# Patient Record
Sex: MALE | Race: WHITE | NOT HISPANIC OR LATINO | Employment: FULL TIME | URBAN - METROPOLITAN AREA
[De-identification: names, ages, dates, MRNs, and addresses within clinical notes are randomized per-mention and may not be internally consistent; named-entity substitution may affect disease eponyms.]

---

## 2017-05-11 ENCOUNTER — HOSPITAL ENCOUNTER (EMERGENCY)
Facility: HOSPITAL | Age: 47
Discharge: HOME | End: 2017-05-11
Attending: SURGERY
Payer: MEDICAID

## 2017-05-11 VITALS
WEIGHT: 211 LBS | SYSTOLIC BLOOD PRESSURE: 117 MMHG | HEART RATE: 69 BPM | DIASTOLIC BLOOD PRESSURE: 66 MMHG | RESPIRATION RATE: 18 BRPM

## 2017-05-11 DIAGNOSIS — F19.10 DRUG ABUSE: ICD-10-CM

## 2017-05-11 LAB
ALBUMIN SERPL BCP-MCNC: 4.3 G/DL
ALP SERPL-CCNC: 59 U/L
ALT SERPL W/O P-5'-P-CCNC: 30 U/L
AMPHET+METHAMPHET UR QL: ABNORMAL
ANION GAP SERPL CALC-SCNC: 14 MMOL/L
APAP SERPL-MCNC: <3 UG/ML
AST SERPL-CCNC: 43 U/L
BACTERIA #/AREA URNS HPF: ABNORMAL /HPF
BARBITURATES UR QL SCN>200 NG/ML: ABNORMAL
BASOPHILS # BLD AUTO: 0.04 K/UL
BASOPHILS NFR BLD: 0.7 %
BENZODIAZ UR QL SCN>200 NG/ML: ABNORMAL
BILIRUB SERPL-MCNC: 0.5 MG/DL
BILIRUB UR QL STRIP: ABNORMAL
BNP SERPL-MCNC: <10 PG/ML
BUN SERPL-MCNC: 16 MG/DL
BZE UR QL SCN: ABNORMAL
CALCIUM SERPL-MCNC: 9.4 MG/DL
CANNABINOIDS UR QL SCN: ABNORMAL
CHLORIDE SERPL-SCNC: 103 MMOL/L
CK MB SERPL-MCNC: 8.1 NG/ML
CK MB SERPL-RTO: 0.7 %
CK SERPL-CCNC: 1194 U/L
CK SERPL-CCNC: 1194 U/L
CLARITY UR: CLEAR
CO2 SERPL-SCNC: 25 MMOL/L
COLOR UR: YELLOW
CREAT SERPL-MCNC: 1.5 MG/DL
CREAT UR-MCNC: 421.6 MG/DL
DIFFERENTIAL METHOD: ABNORMAL
EOSINOPHIL # BLD AUTO: 0.1 K/UL
EOSINOPHIL NFR BLD: 2.1 %
ERYTHROCYTE [DISTWIDTH] IN BLOOD BY AUTOMATED COUNT: 12.7 %
EST. GFR  (AFRICAN AMERICAN): >60 ML/MIN/1.73 M^2
EST. GFR  (NON AFRICAN AMERICAN): 55 ML/MIN/1.73 M^2
ETHANOL SERPL-MCNC: <10 MG/DL
GLUCOSE SERPL-MCNC: 214 MG/DL
GLUCOSE UR QL STRIP: NEGATIVE
HCT VFR BLD AUTO: 39.4 %
HGB BLD-MCNC: 13.6 G/DL
HGB UR QL STRIP: NEGATIVE
HYALINE CASTS #/AREA URNS LPF: 3 /LPF
KETONES UR QL STRIP: ABNORMAL
LEUKOCYTE ESTERASE UR QL STRIP: NEGATIVE
LYMPHOCYTES # BLD AUTO: 1.8 K/UL
LYMPHOCYTES NFR BLD: 28.6 %
MCH RBC QN AUTO: 31.1 PG
MCHC RBC AUTO-ENTMCNC: 34.5 %
MCV RBC AUTO: 90 FL
METHADONE UR QL SCN>300 NG/ML: NEGATIVE
MICROSCOPIC COMMENT: ABNORMAL
MONOCYTES # BLD AUTO: 0.5 K/UL
MONOCYTES NFR BLD: 7.6 %
NEUTROPHILS # BLD AUTO: 3.8 K/UL
NEUTROPHILS NFR BLD: 61 %
NITRITE UR QL STRIP: NEGATIVE
OPIATES UR QL SCN: ABNORMAL
PCP UR QL SCN>25 NG/ML: NEGATIVE
PH UR STRIP: 6 [PH] (ref 5–8)
PLATELET # BLD AUTO: 258 K/UL
PMV BLD AUTO: 11.3 FL
POTASSIUM SERPL-SCNC: 3.7 MMOL/L
PROT SERPL-MCNC: 8 G/DL
PROT UR QL STRIP: ABNORMAL
RBC # BLD AUTO: 4.37 M/UL
RBC #/AREA URNS HPF: 3 /HPF (ref 0–4)
SALICYLATES SERPL-MCNC: <5 MG/DL
SODIUM SERPL-SCNC: 142 MMOL/L
SP GR UR STRIP: >=1.03 (ref 1–1.03)
SQUAMOUS #/AREA URNS HPF: 0 /HPF
T4 FREE SERPL-MCNC: 1.19 NG/DL
TOXICOLOGY INFORMATION: ABNORMAL
TROPONIN I SERPL DL<=0.01 NG/ML-MCNC: <0.006 NG/ML
TSH SERPL DL<=0.005 MIU/L-ACNC: 4.07 UIU/ML
URN SPEC COLLECT METH UR: ABNORMAL
UROBILINOGEN UR STRIP-ACNC: ABNORMAL EU/DL
WBC # BLD AUTO: 6.15 K/UL
WBC #/AREA URNS HPF: 30 /HPF (ref 0–5)

## 2017-05-11 PROCEDURE — 80307 DRUG TEST PRSMV CHEM ANLYZR: CPT | Mod: 59

## 2017-05-11 PROCEDURE — 82570 ASSAY OF URINE CREATININE: CPT

## 2017-05-11 PROCEDURE — 84484 ASSAY OF TROPONIN QUANT: CPT

## 2017-05-11 PROCEDURE — 25000003 PHARM REV CODE 250: Performed by: SURGERY

## 2017-05-11 PROCEDURE — 80329 ANALGESICS NON-OPIOID 1 OR 2: CPT

## 2017-05-11 PROCEDURE — 96360 HYDRATION IV INFUSION INIT: CPT

## 2017-05-11 PROCEDURE — 80320 DRUG SCREEN QUANTALCOHOLS: CPT

## 2017-05-11 PROCEDURE — 83880 ASSAY OF NATRIURETIC PEPTIDE: CPT

## 2017-05-11 PROCEDURE — 84439 ASSAY OF FREE THYROXINE: CPT

## 2017-05-11 PROCEDURE — 36415 COLL VENOUS BLD VENIPUNCTURE: CPT

## 2017-05-11 PROCEDURE — 85025 COMPLETE CBC W/AUTO DIFF WBC: CPT

## 2017-05-11 PROCEDURE — 99284 EMERGENCY DEPT VISIT MOD MDM: CPT | Mod: 25

## 2017-05-11 PROCEDURE — 81000 URINALYSIS NONAUTO W/SCOPE: CPT

## 2017-05-11 PROCEDURE — 80053 COMPREHEN METABOLIC PANEL: CPT

## 2017-05-11 PROCEDURE — 80307 DRUG TEST PRSMV CHEM ANLYZR: CPT

## 2017-05-11 PROCEDURE — 84443 ASSAY THYROID STIM HORMONE: CPT

## 2017-05-11 PROCEDURE — 93010 ELECTROCARDIOGRAM REPORT: CPT | Mod: ,,, | Performed by: INTERNAL MEDICINE

## 2017-05-11 PROCEDURE — 96361 HYDRATE IV INFUSION ADD-ON: CPT

## 2017-05-11 PROCEDURE — 93005 ELECTROCARDIOGRAM TRACING: CPT

## 2017-05-11 PROCEDURE — 82553 CREATINE MB FRACTION: CPT

## 2017-05-11 RX ORDER — SODIUM CHLORIDE 9 MG/ML
1000 INJECTION, SOLUTION INTRAVENOUS
Status: COMPLETED | OUTPATIENT
Start: 2017-05-11 | End: 2017-05-11

## 2017-05-11 RX ADMIN — SODIUM CHLORIDE 1000 ML: 0.9 INJECTION, SOLUTION INTRAVENOUS at 06:05

## 2017-05-11 RX ADMIN — SODIUM CHLORIDE 1000 ML: 0.9 INJECTION, SOLUTION INTRAVENOUS at 02:05

## 2017-05-11 NOTE — ED PROVIDER NOTES
[facility]                                        May 11, 2017                   Chief Complaint  47 y.o. male with Drug Overdose    History of Present Illness  Efren Babin presents to the emergency room with heroin overdose tonight  Patient started to use heroin after the family left the house approximately one hour ago  Patient states he does not know the medication he took but is abusing illegal drugs  Patient states that this was not a suicide attempt but rather him getting high today  The patient was found unresponsive by EMS and briefly coded prior to ER arrival  Narcan given by EMS May the patient alert, was appropriate after that medication  Patient has a long history of cocaine, methamphetamine and heroin abuse issues    The history is provided by the patient  Medical history: Drug abuse  History reviewed. No pertinent surgical history.   No Known Allergies     Review of Systems and Physical Exam     Review of Systems  -- Constitution - no fever, denies fatigue, no weakness, no chills  -- Eyes - no tearing or redness, no visual disturbance  -- Ear, Nose - no tinnitus or earache, no nasal congestion or discharge  -- Mouth,Throat - no sore throat, no toothache, normal voice, normal swallowing  -- Respiratory - denies cough and congestion, no shortness of breath, no ORTIZ  -- Cardiovascular - denies chest pain, no palpitations, denies claudication  -- Gastrointestinal - denies abdominal pain, nausea, vomiting, or diarrheacy   -- Musculoskeletal - denies back pain, negative for myalgias and arthralgias   -- Neurological - no headache, denies weakness or seizure; no LOC  -- Skin - denies pallor, rash, or changes in skin. no hives or welts noted    Vital Signs  -- His blood pressure is 117/66 and his pulse is 69. His respiration is 18.      Physical Exam  -- Nursing note and vitals reviewed  -- Head: Atraumatic. Normocephalic. No obvious abnormality  -- Eyes: Pupils are equal and  reactive to light. Normal conjunctiva and lids  -- Cardiac: Normal rate, regular rhythm and normal heart sounds  -- Pulmonary: Normal respiratory effort, breath sounds clear to auscultation  -- Abdominal: Soft, no tenderness. Normal bowel sounds. Normal liver edge  -- Musculoskeletal: Normal range of motion, no effusions. Joints stable   -- Neurological: No focal deficits. Showed good interaction with staff  -- Vascular: Posterior tibial, dorsalis pedis and radial pulses 2+ bilaterally      Emergency Room Course     Treatment and Evaluation  -- The EKG findings today were without concerning findings from baseline   -- The CBC drawn in the ER today was within normal limits   -- The troponin drawn in the ER today was within normal limits   -- Urinalysis performed during this ER visit showed no signs of infection   -- UDS was positive for polysubstance abuse  -- The tylenol level drawn today was within normal limits  -- The salicylate level drawn today was within normal limits  -- The ethanol level drawn today in the ER was zero   -- Saline lock started in the ER per protocol  -- IV Fluids given in today in the ER  -- Cardiac monitoring per protocol    Abnormal lab values  -- CPK 1194 (*)   -- CPK MB 8.1 (*)   -- Glucose 214 (*)   -- Creatinine 1.5 (*)   -- AST 43 (*)   -- eGFR 55 (*)   -- TSH 4.068 (*)     Diagnosis  -- The encounter diagnosis was Drug abuse.    Disposition and Plan  -- Disposition: home  -- Condition: stable  -- Follow-up: Patient to follow up with a MD in 1-2 days.  -- I advised the patient that we have found no life threatening condition today  -- At this time, I believe the patient is clinically stable for discharge.   -- The patient acknowledges that close follow up with a MD is required   -- Patient agrees to comply with all instruction and direction given in the ER    This note is dictated on Dragon Natural Speaking word recognition program.  There are word recognition mistakes that are  occasionally missed on review.           René Browne MD  05/11/17 1549

## 2017-05-11 NOTE — ED AVS SNAPSHOT
[facility]  [address] [facility] [address]               Efren Babin   2017 12:28 AM   ED    Description:  Male : 1970   Department:  [facility]           Your Care was Coordinated By:     Provider Role From To    René Browne MD Attending Provider 17 0022 --      Reason for Visit     Drug Overdose           Diagnoses this Visit        Comments    Drug abuse           ED Disposition     ED Disposition Condition Comment    Discharge             To Do List           Follow-up Information     Follow up with [facility]. Schedule an appointment as soon as possible for a visit in 2 days.    Contact information:    [address] [facility] [address]        [facility]     [facility] On Call Nurse Care Line -  Assistance  Unless otherwise directed by your provider, please contact [facility], our nurse care line that is available for  assistance.     Registered nurses in the [facility] provide: appointment scheduling, clinical advisement, health education, and other advisory services.  Call: [phone] (toll free)               Medications           These medications were administered today        Dose Freq    0.9%  NaCl infusion 1,000 mL ED 1 Time    Sig: Inject 1,000 mLs into the vein ED 1 Time.    Class: Normal    Route: Intravenous    0.9%  NaCl infusion 1,000 mL ED 1 Time    Sig: Inject 1,000 mLs into the vein ED 1 Time.    Class: Normal    Route: Intravenous           Verify that the below list of medications is an accurate representation of the medications you are currently taking.  If none reported, the list may be blank. If incorrect, please contact your healthcare provider. Carry this list with you in case of emergency.           Current Medications            Clinical Reference Information           Your Vitals Were     BP Pulse Resp Weight          117/66 69 18  95.7 kg (211 lb)        Allergies as of 5/11/2017     No Known Allergies      Immunizations Administered on Date of Encounter - 5/11/2017     None      ED Micro, Lab, POCT     Start Ordered       Status Ordering Provider    05/11/17 0030 05/11/17 0029  Troponin I  STAT      Final result     05/11/17 0030 05/11/17 0029  CK  STAT      Final result     05/11/17 0030 05/11/17 0029  CK-MB  STAT      Final result     05/11/17 0030 05/11/17 0029  Brain natriuretic peptide  STAT      Final result     05/11/17 0030 05/11/17 0029  Comprehensive metabolic panel  STAT      Final result     05/11/17 0030 05/11/17 0029  CBC auto differential  STAT      Final result     05/11/17 0030 05/11/17 0029  Acetaminophen level  STAT      Final result     05/11/17 0030 05/11/17 0029  Salicylate level  STAT      Final result     05/11/17 0030 05/11/17 0029  Urinalysis  STAT      In process     05/11/17 0030 05/11/17 0029  Drug screen panel, emergency  STAT      In process     05/11/17 0030 05/11/17 0029  TSH  STAT      Final result     05/11/17 0030 05/11/17 0029  Ethanol  STAT      Final result     05/11/17 0029 05/11/17 0029  T4, free  Once      Final result     05/11/17 0029 05/11/17 0029  Urinalysis Microscopic  Once      In process       ED Imaging Orders     None        Discharge Instructions         Recovering from Addiction: Continuing with Counseling  The road to recovery can be tough. But working with a counselor can help make your recovery smoother and keep you on track. A counselor can help you decide which lifestyle changes you want to make to stay sober. Also, consider talking with a counselor about other issues you may want to work on. He or she can help you find resources for anger management, problem-solving skills, or assertiveness training.    Be aware of your triggers  Triggers are things that make you want to use again. They can be people you used with or places, things, and events that make you want to use. Stress and  "feelings like loneliness, anxiety, or depression can also make you want to use again. When you know what your triggers are, you can plan ways to avoid them when possible. To find your triggers, get a piece of paper. List the people, places, events, or feelings that could make you want to use again. Keep this paper. Add to it as needed. Once you have a full list, decide how to cope with these triggers without using.  Getting help  Once you admit that you have a substance abuse problem, there are many ways to find help.  · Contact your Employee Assistance Program (EAP) or Human Resources department.  · Talk to your primary care doctor and ask for a referral to an addiction specialist for an evaluation.  · Look in the white pages of your phone book for local chapters of these groups:  ¨ Alcoholics Anonymous  ¨ Cocaine Anonymous  ¨ Marijuana Anonymous  ¨ Narcotics Anonymous  ¨ SMART Recovery  · Look in the yellow pages of your phone book under 1 of the following:  ¨ Alcoholism Information and Treatment Center  ¨ Drug Abuse and Addiction Information and Treatment Center  · Look online for treatment centers near you:  ¨ Substance Abuse and Mental Health Services Administration's "treatment finder": http:// findtreatment.samhsa.gov  · Contact 1 of these national groups:  ¨ National [place] on Alcoholism and Drug Dependence  987.955.2075  ¨ National Drug and Alcohol Treatment Referral Service  800-662-HELP (482-608-4394)   Date Last Reviewed: 11/12/2014  © 2284-8598 [employer]. [address] [place] [address]. All rights reserved. This information is not intended as a substitute for professional medical care. Always follow your healthcare professional's instructions.          [facility] Sign-Up     Activating your [facility] account is as easy as 1-2-3!     1) Visit [email or web address], select Sign Up Now, enter this activation code and your date of birth, then select Next.  [email or web address]  Expires: " 6/25/2017  1:29 AM      2) Create a username and password to use when you visit [facility] in the future and select a security question in case you lose your password and select Next.    3) Enter your e-mail address and click Sign Up!    Additional Information  If you have questions, please e-mail [email or web address] or call [phone] to talk to our [facility] staff. Remember, [facility] is NOT to be used for urgent needs. For medical emergencies, dial 911.         Smoking Cessation     If you would like to quit smoking:   You may be eligible for free services if you are a [place] resident and started smoking cigarettes before September 1, 1988.  Call the Smoking Cessation Trust (SCT) toll free at (434) 551-0264 or (604) 327-0916.   Call 2-800-QUIT-NOW if you do not meet the above criteria.   Contact us via email: [email or web address]   View our website for more information: [email or web address]         [facility] complies with applicable Federal civil rights laws and does not discriminate on the basis of race, color, national origin, age, disability, or sex.        Language Assistance Services     ATTENTION: Language assistance services are available, free of charge. Please call [phone].      ATENCIÓN: Si habla español, tiene a mccord disposición servicios gratuitos de asistencia lingüística. Llame al [phone].     CHÚ Ý: N?u b?n nói Ti?ng Vi?t, có các d?ch v? h? tr? ngôn ng? mi?n phí dành cho b?n. G?i s? [phone].

## 2017-05-11 NOTE — DISCHARGE INSTRUCTIONS
"  Recovering from Addiction: Continuing with Counseling  The road to recovery can be tough. But working with a counselor can help make your recovery smoother and keep you on track. A counselor can help you decide which lifestyle changes you want to make to stay sober. Also, consider talking with a counselor about other issues you may want to work on. He or she can help you find resources for anger management, problem-solving skills, or assertiveness training.    Be aware of your triggers  Triggers are things that make you want to use again. They can be people you used with or places, things, and events that make you want to use. Stress and feelings like loneliness, anxiety, or depression can also make you want to use again. When you know what your triggers are, you can plan ways to avoid them when possible. To find your triggers, get a piece of paper. List the people, places, events, or feelings that could make you want to use again. Keep this paper. Add to it as needed. Once you have a full list, decide how to cope with these triggers without using.  Getting help  Once you admit that you have a substance abuse problem, there are many ways to find help.  · Contact your Employee Assistance Program (EAP) or Human Resources department.  · Talk to your primary care doctor and ask for a referral to an addiction specialist for an evaluation.  · Look in the white pages of your phone book for local chapters of these groups:  ¨ Alcoholics Anonymous  ¨ Cocaine Anonymous  ¨ Marijuana Anonymous  ¨ Narcotics Anonymous  ¨ SMART Recovery  · Look in the yellow pages of your phone book under 1 of the following:  ¨ Alcoholism Information and Treatment Center  ¨ Drug Abuse and Addiction Information and Treatment Center  · Look online for treatment centers near you:  ¨ Substance Abuse and Mental Health Services Administration's "treatment finder": http:// findtreatment.samhsa.gov  · Contact 1 of these national groups:  ¨ National " [place] on Alcoholism and Drug Dependence  118.772.2509  [facility] Drug and Alcohol Treatment Referral Service  798-354-HELP (065-963-4008)   Date Last Reviewed: 11/12/2014  © 7405-0719 [employer]. [address] [place] [address]. All rights reserved. This information is not intended as a substitute for professional medical care. Always follow your healthcare professional's instructions.

## 2017-07-07 ENCOUNTER — HOSPITAL ENCOUNTER (INPATIENT)
Facility: HOSPITAL | Age: 47
LOS: 4 days | Discharge: HOME | DRG: 885 | End: 2017-07-11
Attending: PSYCHIATRY & NEUROLOGY | Admitting: PSYCHIATRY & NEUROLOGY
Payer: MEDICAID

## 2017-07-07 DIAGNOSIS — R45.851 DEPRESSION WITH SUICIDAL IDEATION: Primary | ICD-10-CM

## 2017-07-07 DIAGNOSIS — F19.20: ICD-10-CM

## 2017-07-07 DIAGNOSIS — F17.210 CIGARETTE NICOTINE DEPENDENCE WITHOUT COMPLICATION: ICD-10-CM

## 2017-07-07 DIAGNOSIS — F33.2 SEVERE EPISODE OF RECURRENT MAJOR DEPRESSIVE DISORDER, WITHOUT PSYCHOTIC FEATURES: ICD-10-CM

## 2017-07-07 DIAGNOSIS — F32.A DEPRESSION WITH SUICIDAL IDEATION: Primary | ICD-10-CM

## 2017-07-07 DIAGNOSIS — F41.1 GAD (GENERALIZED ANXIETY DISORDER): ICD-10-CM

## 2017-07-07 PROCEDURE — HZ2ZZZZ DETOXIFICATION SERVICES FOR SUBSTANCE ABUSE TREATMENT: ICD-10-PCS | Performed by: PSYCHIATRY & NEUROLOGY

## 2017-07-07 PROCEDURE — 27000339 *HC DAILY SUPPLY KIT

## 2017-07-07 PROCEDURE — 80061 LIPID PANEL: CPT

## 2017-07-07 PROCEDURE — 99223 1ST HOSP IP/OBS HIGH 75: CPT | Mod: AF,HB,S$PBB, | Performed by: PSYCHIATRY & NEUROLOGY

## 2017-07-07 PROCEDURE — 11400000 HC PSYCH PRIVATE ROOM

## 2017-07-07 PROCEDURE — 25000003 PHARM REV CODE 250: Performed by: PSYCHIATRY & NEUROLOGY

## 2017-07-07 PROCEDURE — 90833 PSYTX W PT W E/M 30 MIN: CPT | Mod: AF,HB,S$PBB, | Performed by: PSYCHIATRY & NEUROLOGY

## 2017-07-07 RX ORDER — OLANZAPINE 10 MG/1
10 TABLET ORAL EVERY 4 HOURS PRN
Status: DISCONTINUED | OUTPATIENT
Start: 2017-07-07 | End: 2017-07-11 | Stop reason: HOSPADM

## 2017-07-07 RX ORDER — DOCUSATE SODIUM 100 MG/1
100 CAPSULE, LIQUID FILLED ORAL DAILY PRN
Status: DISCONTINUED | OUTPATIENT
Start: 2017-07-07 | End: 2017-07-11 | Stop reason: HOSPADM

## 2017-07-07 RX ORDER — OLANZAPINE 10 MG/2ML
10 INJECTION, POWDER, FOR SOLUTION INTRAMUSCULAR EVERY 4 HOURS PRN
Status: DISCONTINUED | OUTPATIENT
Start: 2017-07-07 | End: 2017-07-11 | Stop reason: HOSPADM

## 2017-07-07 RX ORDER — MAG HYDROX/ALUMINUM HYD/SIMETH 200-200-20
30 SUSPENSION, ORAL (FINAL DOSE FORM) ORAL EVERY 6 HOURS PRN
Status: DISCONTINUED | OUTPATIENT
Start: 2017-07-07 | End: 2017-07-11 | Stop reason: HOSPADM

## 2017-07-07 RX ORDER — IBUPROFEN 200 MG
1 TABLET ORAL DAILY PRN
Status: DISCONTINUED | OUTPATIENT
Start: 2017-07-07 | End: 2017-07-11 | Stop reason: HOSPADM

## 2017-07-07 RX ORDER — ACETAMINOPHEN 325 MG/1
650 TABLET ORAL EVERY 6 HOURS PRN
Status: DISCONTINUED | OUTPATIENT
Start: 2017-07-07 | End: 2017-07-11 | Stop reason: HOSPADM

## 2017-07-07 RX ORDER — BUPROPION HYDROCHLORIDE 150 MG/1
150 TABLET ORAL DAILY
Status: DISCONTINUED | OUTPATIENT
Start: 2017-07-08 | End: 2017-07-11 | Stop reason: HOSPADM

## 2017-07-07 RX ORDER — DIAZEPAM 5 MG/1
5 TABLET ORAL 3 TIMES DAILY
Status: DISCONTINUED | OUTPATIENT
Start: 2017-07-07 | End: 2017-07-08

## 2017-07-07 RX ORDER — LOPERAMIDE HYDROCHLORIDE 2 MG/1
2 CAPSULE ORAL
Status: DISCONTINUED | OUTPATIENT
Start: 2017-07-07 | End: 2017-07-11 | Stop reason: HOSPADM

## 2017-07-07 RX ORDER — HYDROXYZINE PAMOATE 50 MG/1
50 CAPSULE ORAL NIGHTLY PRN
Status: DISCONTINUED | OUTPATIENT
Start: 2017-07-07 | End: 2017-07-11 | Stop reason: HOSPADM

## 2017-07-07 RX ADMIN — THERA TABS 1 TABLET: TAB at 01:07

## 2017-07-07 RX ADMIN — DIAZEPAM 5 MG: 5 TABLET ORAL at 01:07

## 2017-07-07 RX ADMIN — DIAZEPAM 5 MG: 5 TABLET ORAL at 09:07

## 2017-07-07 NOTE — PLAN OF CARE
Problem: Overarching Goals (Adult)  Goal: Develops/Participates in Therapeutic [place] to Support Successful Transition  Group Note    Behavior:  Patient attended Psychotherapy Group and participated. Patient presents with constrict affect, depressed mood.     Intervention:  [place]  Enabling Pitfalls. Reviewed and discussed the story at different levels. Discussed making changes, feeling helpless, and making choices.     Response:  Patient states he has shame and guilt that his addiction has affected his family and he realizes he can't deal with it on his own.     Plan:  Will continue to encourage patient participation in group. Will meet 1:1 with patient later.

## 2017-07-07 NOTE — PLAN OF CARE
"  Treatment Recommendation:   1:1 Intervention (as needed)    Cognitive Stimulation Skilled Activity  Creative Expression Skilled Activity  Mild Exercises Skilled Activity  Stress Management Skilled Activity  Coping Skilled Activity  Leisure Education and Awareness Skilled Activity    Treatment Goal(s):  Long Term Goals Refer To Master Treatment Plan    Short Term Treatment Goal(s)  Patient Will:  Exhibit Improvement in Mood  Demonstrate Constructive Expression of Feelings and Behavior  Identify (+) Leisure / Recreation Activities that Promote Wellness and Promote a Sober Lifestyle    Discharge Recommendations:  AA/NA Meetings  Follow Up with After Care Appointments  Continue with Current Leisure Activities     Patient presents with depressed affect and "tired, weak with energy, depressed nervous anxious mood." Patient states his reason for admit is due to "Been fighting this addiction. Lost everything, didn't have nowhere else to turn. I was thinking about suicide. Patient admits to negative leisure lifestyle of cocaine, heroin, meth, THC, alcohol(rare), tobacco(smokeless). Patient states he has been struggling with drugs since in his 20's. Patient reports he is , has 2 daughters, high school education, unemployed(one year now), lives alone in [place]. Patient verbalized main goal "To beat this addiction, go to an in patient program and N/A Meetings(for support)."  "
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
## 2017-07-07 NOTE — PSYCH
Pt accepted for admission by Dr Sanchez.Report received from Nathalia SEO.Pt arrived on unit at 1203pm.Routine security check by erin performed prior to entry on unit.No contraband found.Pt Pt brought to ER by his ex mother in law.Pt has been depressed and feeling suicidal.Pt has drug addiction.Pt uses heroin meth,cocaine,and mj.Last use of heroin was 10 days ago.Last use of meth was 5 days ago.Pt feels his use is out of control.Pt reports he had a drug overdose(heroin) on 5/10/17 and is not sure if it was an accident or intentional.Pt was treated in our ER and sent home.Pt had a long period of being sober until he was laid off in July of this year.Pt is interested in rehab.Pt given unit tour and educated on unit rules and program.Pt verbalized an understand.

## 2017-07-08 PROCEDURE — 11400000 HC PSYCH PRIVATE ROOM

## 2017-07-08 PROCEDURE — 99232 SBSQ HOSP IP/OBS MODERATE 35: CPT | Mod: ,,, | Performed by: FAMILY MEDICINE

## 2017-07-08 PROCEDURE — 99233 SBSQ HOSP IP/OBS HIGH 50: CPT | Mod: AF,HB,S$PBB, | Performed by: PSYCHIATRY & NEUROLOGY

## 2017-07-08 PROCEDURE — 27000339 *HC DAILY SUPPLY KIT

## 2017-07-08 PROCEDURE — 90833 PSYTX W PT W E/M 30 MIN: CPT | Mod: AF,HB,S$PBB, | Performed by: PSYCHIATRY & NEUROLOGY

## 2017-07-08 PROCEDURE — 25000003 PHARM REV CODE 250: Performed by: PSYCHIATRY & NEUROLOGY

## 2017-07-08 RX ORDER — DIAZEPAM 5 MG/1
5 TABLET ORAL
Status: DISCONTINUED | OUTPATIENT
Start: 2017-07-08 | End: 2017-07-09

## 2017-07-08 RX ADMIN — BUPROPION HYDROCHLORIDE 150 MG: 150 TABLET, EXTENDED RELEASE ORAL at 08:07

## 2017-07-08 RX ADMIN — DIAZEPAM 5 MG: 5 TABLET ORAL at 12:07

## 2017-07-08 RX ADMIN — THERA TABS 1 TABLET: TAB at 08:07

## 2017-07-08 RX ADMIN — DIAZEPAM 5 MG: 5 TABLET ORAL at 06:07

## 2017-07-08 RX ADMIN — DIAZEPAM 5 MG: 5 TABLET ORAL at 04:07

## 2017-07-08 RX ADMIN — DIAZEPAM 5 MG: 5 TABLET ORAL at 07:07

## 2017-07-08 NOTE — CONSULTS
"    [facility]  Adult Nutrition  Consult Note    SUMMARY     Recommendations    Recommendation/Intervention:   1. Continue current diet   2. RD to monitor    Goals: Meet 85% EEN  Nutrition Goal Status: new       Continuum of Care Plan    Referral to Outpatient Services:  (D/C planning: Regular diet )    Reason for Assessment    Reason for Assessment: physician consult  Diagnosis:  (Opiod addiction)  Relevent Medical History:  (Drug use)       General Information Comments: Patient eating 100% of meals per Nsg notes. No issues with n/v/or abd. pain noted. Attempted to speak with patient via telephone but was not available. RD to f/u.     Nutrition Prescription Ordered    Current Diet Order: Regular      Evaluation of Received Nutrients/Fluid Intake      % Intake of Estimated Energy Needs: %  % Meal Intake:  100%    I/O: not recorded at this time.    Nutrition/Diet History     Food Preferences: ETHAN     Labs/Tests/Procedures/Meds     Pertinent Labs Reviewed: reviewed   Pertinent Medications Reviewed: reviewed     Physical Findings    Overall Physical Appearance: nourished   Oral/Mouth Cavity: WDL  Skin: intact    Anthropometrics    Temp: 96.7 °F (35.9 °C)     Height: 6' 2" (188 cm)  Weight Method: Standard Scale  Weight: 92.1 kg (203 lb)  Ideal Body Weight (IBW), Male: 190 lb     % Ideal Body Weight, Male (lb): 106.84 lb     BMI (Calculated): 26.1  BMI Grade: 25 - 29.9 - overweight     Estimated/Assessed Needs    Weight Used For Calorie Calculations: 92.1 kg (203 lb 0.7 oz)       Energy Need Method: [place]-St Jeor (8948-4297 (x 1.2-1.3))     RMR ([place]-St. Jeor Equation): 1865.75      Weight Used For Protein Calculations: 92.1 kg (203 lb 0.7 oz)  Protein Requirements: 75-90 g     Fluid Need Method: RDA Method     Assessment and Plan    Nutrition Diagnosis    No nutrition related issues at this time.    Monitor and Evaluation    Food and Nutrient Intake: energy intake, food and beverage " intake  Food and Nutrient Adminstration: diet order   Anthropometric Measurements: weight, weight change  Biochemical Data, Medical Tests and Procedures: electrolyte and renal panel  Nutrition-Focused Physical Findings: overall appearance    Nutrition Risk    Level of Risk:  (1 x week)    Nutrition Follow-Up    RD Follow-up?: Yes

## 2017-07-08 NOTE — PLAN OF CARE
Problem: Patient Care Overview (Adult)  Goal: Plan of Care Review  Outcome: Ongoing (interventions implemented as appropriate)  Pt has slept 7.5 hours uninterrupted so far.  NAD.  Resp even & unlabored.  Pathways clear and bed is low.  Q 15 minute safety checks ongoing.  All precautions maintained.

## 2017-07-08 NOTE — CONSULTS
History & Physical      SUBJECTIVE:     History of Present Illness:  Patient is a 47 y.o. male presents with depression. Admitted to [facility].    No past medical history other than psych. No complaints today.    No prescriptions prior to admission.       Review of patient's allergies indicates:  No Known Allergies    Past Medical History:   Diagnosis Date    Anxiety     Depression     Drug abuse     Fatigue     Hx of psychiatric care     Psychiatric problem     PTSD (post-traumatic stress disorder)     Therapy      Past Surgical History:   Procedure Laterality Date    APPENDECTOMY      HERNIA REPAIR      VASECTOMY  2012     History reviewed. No pertinent family history.  Social History   Substance Use Topics    Smoking status: Former Smoker     Types: Cigarettes    Smokeless tobacco: Former User     Types: Chew     Quit date: 7/3/2017    Alcohol use 3.6 oz/week     6 Cans of beer per week      Comment: seldom        Review of Systems:  Constitutional: no fever or chills  Respiratory: no cough or shorness of breath  Cardiovascular: no chest pain or palpitations  Gastrointestinal: no nausea or vomiting, no abdominal pain or change in bowel habits  Musculoskeletal: no arthralgias or myalgias  Psych:Depressed moods.  OBJECTIVE:     Vital Signs (Most Recent)  Temp: 97.4 °F (36.3 °C) (07/07/17 2031)  Pulse: 74 (07/07/17 2031)  Resp: 18 (07/07/17 2031)  BP: (!) 115/57 (07/07/17 2031)    Physical Exam:  General: well developed, well nourished  Lungs:  clear to auscultation bilaterally and normal respiratory effort  Cardiovascular: Heart: regular rate and rhythm, S1, S2 normal, no murmur, click, rub or gallop. Chest Wall: no tenderness. Extremities: no cyanosis or edema, or clubbing. Pulses: 2+ and symmetric.  Abdomen/Rectal: Abdomen: soft, non-tender non-distented; bowel sounds normal; no masses,  no organomegaly. Rectal: not examined  Skin: Skin color, texture, turgor normal. No rashes or  lesions  Musculoskeletal:normal gait  Psych:Quiet and calm  Neuro: Cranial nerves:  CN II Visual fields full to confrontation.   CN III, IV, VI Pupils are equal, round, and reactive to light.  CN III: no palsy  Nystagmus: none   Diplopia: none  Ophthalmoparesis: none  CN V Facial sensation intact.   CN VII Facial expression full, symmetric.   CN VIII normal.   CN IX normal.   CN X normal.   CN XI normal.   CN XII normal.        Laboratory  CBC:   Recent Labs  Lab 07/05/17  2300   WBC 7.22   RBC 4.02*   HGB 12.5*   HCT 38.3*      MCV 95   MCH 31.1*   MCHC 32.6     CMP:   Recent Labs  Lab 07/05/17  2300   GLU 65*   CALCIUM 8.6*   ALBUMIN 3.5   PROT 6.6      K 3.8   CO2 25      BUN 13   CREATININE 1.0   ALKPHOS 59   ALT 19   AST 18   BILITOT 0.3       Recent Labs  Lab 07/05/17  2256   COLORU Yellow   SPECGRAV >=1.030*   PHUR 6.0   PROTEINUA 1+*   BACTERIA Occasional   NITRITE Negative   LEUKOCYTESUR Negative   UROBILINOGEN 1.0   HYALINECASTS 0     TSH   Date Value Ref Range Status   07/05/2017 1.861 0.400 - 4.000 uIU/mL Final     No results found for this or any previous visit.  Alcohol, Medical, Serum   Date Value Ref Range Status   07/05/2017 <10 <10 mg/dL Final     Acetaminophen (Tylenol), Serum   Date Value Ref Range Status   07/05/2017 <3.0 (L) 10.0 - 20.0 ug/mL Final     Comment:     Toxic Levels:  Adults (4 hr post-ingestion).........>150 ug/mL  Adults (12 hr post-ingestion)........>40 ug/mL  Peds (2 hr post-ingestion, liquid)...>225 ug/mL       Results for orders placed or performed during the hospital encounter of 07/05/17   Salicylate level   Result Value Ref Range    Salicylate Lvl <5.0 (L) 15.0 - 30.0 mg/dL     Results for orders placed or performed during the hospital encounter of 07/05/17   Drug screen panel, emergency   Result Value Ref Range    Benzodiazepines Negative     Methadone metabolites Negative     Cocaine (Metab.) Presumptive Positive     Opiate Scrn, Ur Negative      Barbiturate Screen, Ur Presumptive Positive     Amphetamine Screen, Ur Presumptive Positive     THC Presumptive Positive     Phencyclidine Negative     Creatinine, Random Ur 348.9 23.0 - 375.0 mg/dL    Toxicology Information SEE COMMENT      No results found for: PREGTESTUR    ASSESSMENT/PLAN:     Active Hospital Problems    Diagnosis  POA    Depression with suicidal ideation [F32.9, R45.851]  Not Applicable    Severe episode of recurrent major depressive disorder, without psychotic features [F33.2]  Yes    ARMANI (generalized anxiety disorder) [F41.1]  Yes    Polysubstance (including opioids) dependence with physiol dependence [F19.20]  Yes    Cigarette nicotine dependence without complication [F17.210]  Yes      Resolved Hospital Problems    Diagnosis Date Resolved POA   No resolved problems to display.       Plan: Further orders for psych

## 2017-07-08 NOTE — PLAN OF CARE
Problem: Patient Care Overview (Adult)  Goal: Plan of Care Review  Outcome: Ongoing (interventions implemented as appropriate)  Pt isolative to assigned room.  Out to use phone this evening and for snack..  Mainly stays in room reading.  Calm, cooperative, quiet, pleasant.  No s/s withdrawal.  Tolerating valium taper.  Agreeable with plan of care.  Safety checks ongoing.

## 2017-07-08 NOTE — PLAN OF CARE
Recommendations     Recommendation/Intervention:   1. Continue current diet   2. RD to monitor     Goals: Meet 85% EEN  Nutrition Goal Status: new        Continuum of Care Plan     Referral to Outpatient Services:  (D/C planning: Regular diet )

## 2017-07-08 NOTE — PLAN OF CARE
Problem: Patient Care Overview (Adult)  Goal: Plan of Care Review  Outcome: Ongoing (interventions implemented as appropriate)  Shift note : patient is in the day room with staff and peers . He is eating all meals and taking all medications . He states that he was depressed and suicidal because of drug use . He now wants rehab . Pleasant affect .

## 2017-07-08 NOTE — PROGRESS NOTES
PSYCHIATRY DAILY INPATIENT PROGRESS NOTE  SUBSEQUENT HOSPITAL VISIT    ENCOUNTER DATE: 7/8/2017  SITE: [facility]    DATE OF ADMISSION: 7/7/2017 12:04 PM  LENGTH OF STAY: 1 days      HISTORY    CHIEF COMPLAINT   Efren Babin is a 47 y.o. male, seen during daily portillo rounds on the inpatient unit.  Efren Babin presents with the chief complaint of depression and addiction    HPI   (Elements: Location, Quality, Severity, Duration, Timing, Content, Modifying Factors, Associated Signs & Symptoms)    The patient was seen and examined. The chart was reviewed.    Staff reports no behavioral or management issues.     The patient has been compliant with treatment. The patient denied any side effects.    Continued Symptoms of Depression: +diminished mood or loss of interest/anhedonia; positive for irritability, diminished energy, change in sleep, change in appetite, diminished concentration or cognition or indecisiveness, PMA/R, excessive guilt or hopelessness or worthlessness, and suicidal ideations     Continued Changes in Sleep: +trouble with initiation/maintenance, no early morning awakening with inability to return to sleep     Continued Suicidal/(no)Homicidal ideations: +active/passive ideations, no organized plans, no future intentions     Denied Symptoms of psychosis: no hallucinations, delusions, disorganized thinking, disorganized behavior or abnormal motor behavior, or negative symptoms      Denied Symptoms of crow or hypomania: no elevated, expansive, or irritable mood with no increased energy or activity; with inflated self-esteem or grandiosity, decreased need for sleep, increased rate of speech, FOI or racing thoughts, distractibility, increased goal directed activity or PMA, or risky/disinhibited behavior     Continued Symptoms of ARMANI: +excessive anxiety/worry/fear, +more days than not, +about numerous issues, +difficult to control, with restlessness, fatigue, poor concentration,  irritability, muscle tension, and sleep disturbance; +causes functionally impairing distress     Continue s/s of withdrawal with dysphoria, restlessness, and malaise.     PSYCHOTHERAPY ADD-ON +[id number]   30 (16-37*) minutes    Time: 25 minutes  Participants: Met with patient    Therapeutic Intervention Type: behavior modifying psychotherapy, supportive psychotherapy  Why chosen therapy is appropriate versus another modality: relevant to diagnosis, patient responds to this modality, evidence based practice    Target symptoms: depression, anxiety , substance abuse  Primary focus: substance use  Psychotherapeutic techniques: supportive, behavioral, cognitive and motivational techniques; psycho-education     Outcome monitoring methods: self-report, observation    Patient's response to intervention:  The patient's response to intervention is accepting.    Progress toward goals:  The patient's progress toward goals is good.          ROS  General ROS: negative  Ophthalmic ROS: negative  ENT ROS: negative  Allergy and Immunology ROS: negative  Hematological and Lymphatic ROS: negative  Endocrine ROS: negative  Respiratory ROS: no cough, shortness of breath, or wheezing  Cardiovascular ROS: no chest pain or dyspnea on exertion  Gastrointestinal ROS: no abdominal pain, change in bowel habits, or black or bloody stools  Genito-Urinary ROS: no dysuria, trouble voiding, or hematuria  Musculoskeletal ROS: negative  Neurological ROS: no TIA or stroke symptoms  Dermatological ROS: negative    PAST MEDICAL HISTORY   Past Medical History:   Diagnosis Date    Anxiety     Depression     Drug abuse     Fatigue     Hx of psychiatric care     Psychiatric problem     PTSD (post-traumatic stress disorder)     Therapy            PSYCHOTROPIC MEDICATIONS   Scheduled Meds:   buPROPion  150 mg Oral Daily    diazePAM  5 mg Oral TID    multivitamin  1 tablet Oral Daily     Continuous Infusions:   PRN Meds:.acetaminophen,  "aluminum-magnesium hydroxide-simethicone, docusate sodium, hydrOXYzine pamoate, loperamide, nicotine, olanzapine **AND** olanzapine        EXAMINATION    VITALS   Vitals:    07/08/17 0800   BP: 118/68   Pulse: 72   Resp: 16   Temp: 96.7 °F (35.9 °C)       CONSTITUTIONAL  General Appearance: WM, in casual garb; NAD     MUSCULOSKELETAL  Muscle Strength and Tone:  normal  Abnormal Involuntary Movements:  none  Gait and Station:  normal; non-ataxic     PSYCHIATRIC   Level of Consciousness: awake, alert  Orientation: p/p/t/s  Grooming: decreased and inadequate to circumstances  Psychomotor Behavior: mild PMA, no PMR  Speech: nl r/t/v/s  Language:  English fluent  Mood: "down"  Affect: anxious, restless, dysphoric  Thought Process:  linear and organized  Associations:  intact; no JEISON  Thought Content:  denied AVH/delusions; denied HI, +SI  Memory:  intact to recent and remote events  Attention:  intact to conversation; not distractible   Fund of Knowledge:  age and education appropriate  Estimate if Intelligence:  average based on work/education history, vocabulary and mental status exam  Insight:  good- seeks help, recognizes illness  Judgment:   good- no bx issues, compliant and cooperative    DIAGNOSTIC TESTING   Laboratory Results  No results found for this or any previous visit (from the past 24 hour(s)).      ASSESSMENT      IMPRESSION   MDD, recurrent, severe without psychotic features  ARMANI     Polysubstance Dependence (opiates, sedative hypnotics, amphetamines, cocaine, cannabis; severe, continuous, with physiological dependence)  Nicotine Dependence      Opiate, Meth and Sedative Hypnotic withdrawal           MEDICAL DECISION MAKING        PROBLEM LIST AND MANAGEMENT PLANS   Depression  Anxiety  Substance/nicotine use  withdrawal     PRESCRIPTION DRUG MANAGEMENT  Compliance: yes  Side Effects: no  Regimen Adjustments:   Depression- Wellbutrin  mg po q day     Anxiety- Wellbutrin off-label as above; vistaril " 50 mg po q 6 hours prn     Substance/nicotine use- Wellbutrin as above; patient counseled; inpatient rehab once stable     Withdrawal- valium to 5 mg po QID; will adjust and taper as indicated and able        DIAGNOSTIC TESTING  Labs reviewed     Disposition:  -SW to assist with aftercare planning and collateral  -Once stable discharge home with outpatient follow up care and/or rehab  -Continue inpatient treatment under a PEC and/or CEC for danger to self and grave disability as evident by severe depression with SI and severe substance use problems requiring inpatient detox and stabilization    NEED FOR CONTINUED HOSPITALIZATION  Psychiatric illness continues to pose a potential threat to life or bodily function, of self or others, thereby requiring the need for continued inpatient psychiatric hospitalization: Yes    Protective inpatient pyschiatric hospitalization required while a safe disposition plan is enacted: Yes    Patient stabilized and ready for discharge from inpatient psychiatric unit: No      STAFF:   Cash Sanchez MD  Psychiatry

## 2017-07-09 PROCEDURE — 27000339 *HC DAILY SUPPLY KIT

## 2017-07-09 PROCEDURE — 99233 SBSQ HOSP IP/OBS HIGH 50: CPT | Mod: AF,HB,S$PBB, | Performed by: PSYCHIATRY & NEUROLOGY

## 2017-07-09 PROCEDURE — 90833 PSYTX W PT W E/M 30 MIN: CPT | Mod: AF,HB,S$PBB, | Performed by: PSYCHIATRY & NEUROLOGY

## 2017-07-09 PROCEDURE — 11400000 HC PSYCH PRIVATE ROOM

## 2017-07-09 PROCEDURE — 25000003 PHARM REV CODE 250: Performed by: PSYCHIATRY & NEUROLOGY

## 2017-07-09 RX ORDER — DIAZEPAM 5 MG/1
5 TABLET ORAL 3 TIMES DAILY
Status: DISCONTINUED | OUTPATIENT
Start: 2017-07-09 | End: 2017-07-11

## 2017-07-09 RX ADMIN — HYDROXYZINE PAMOATE 50 MG: 50 CAPSULE ORAL at 09:07

## 2017-07-09 RX ADMIN — DIAZEPAM 5 MG: 5 TABLET ORAL at 09:07

## 2017-07-09 RX ADMIN — THERA TABS 1 TABLET: TAB at 09:07

## 2017-07-09 RX ADMIN — BUPROPION HYDROCHLORIDE 150 MG: 150 TABLET, EXTENDED RELEASE ORAL at 09:07

## 2017-07-09 RX ADMIN — DIAZEPAM 5 MG: 5 TABLET ORAL at 01:07

## 2017-07-09 NOTE — PLAN OF CARE
Problem: Patient Care Overview (Adult)  Goal: Plan of Care Review  Outcome: Ongoing (interventions implemented as appropriate)  Pt has slept 7.5 hours so far without any interruptions.  NAD. Resp even & unlabored.  Pathways clear and bed is low.  Q 15 minute safety checks ongoing.  All precautions maintained.

## 2017-07-09 NOTE — PROGRESS NOTES
PSYCHIATRY DAILY INPATIENT PROGRESS NOTE  SUBSEQUENT HOSPITAL VISIT    ENCOUNTER DATE: 7/9/2017  [facility]    DATE OF ADMISSION: 7/7/2017 12:04 PM  LENGTH OF STAY: 2 days      HISTORY    CHIEF COMPLAINT   Efren Babin is a 47 y.o. male, seen during daily portillo rounds on the inpatient unit.  Efren Babin presents with the chief complaint of depression and addiction    HPI   (Elements: Location, Quality, Severity, Duration, Timing, Content, Modifying Factors, Associated Signs & Symptoms)    The patient was seen and examined. The chart was reviewed.    Staff reports no behavioral or management issues.     The patient has been compliant with treatment. The patient denied any side effects.    Symptoms remain minimally changed as documented below.     Continued Symptoms of Depression: +diminished mood or loss of interest/anhedonia; positive for irritability, diminished energy, change in sleep, change in appetite, diminished concentration or cognition or indecisiveness, PMA/R, excessive guilt or hopelessness or worthlessness, and suicidal ideations     Continued Changes in Sleep: +trouble with initiation/maintenance, no early morning awakening with inability to return to sleep     Continued Suicidal/(no)Homicidal ideations: +active/passive ideations, no organized plans, no future intentions     Denied Symptoms of psychosis: no hallucinations, delusions, disorganized thinking, disorganized behavior or abnormal motor behavior, or negative symptoms      Denied Symptoms of crow or hypomania: no elevated, expansive, or irritable mood with no increased energy or activity; with inflated self-esteem or grandiosity, decreased need for sleep, increased rate of speech, FOI or racing thoughts, distractibility, increased goal directed activity or PMA, or risky/disinhibited behavior     Continued Symptoms of ARMANI: +excessive anxiety/worry/fear, +more days than not, +about numerous issues, +difficult to control,  with restlessness, fatigue, poor concentration, irritability, muscle tension, and sleep disturbance; +causes functionally impairing distress     Continue s/s of withdrawal with dysphoria, restlessness, and malaise.     PSYCHOTHERAPY ADD-ON +[id number]   30 (16-37*) minutes    Time: 18 minutes  Participants: Met with patient    Therapeutic Intervention Type: behavior modifying psychotherapy, supportive psychotherapy  Why chosen therapy is appropriate versus another modality: relevant to diagnosis, patient responds to this modality, evidence based practice    Target symptoms: depression, anxiety , substance abuse  Primary focus: substance use  Psychotherapeutic techniques: supportive, behavioral, cognitive and motivational techniques; psycho-education     Outcome monitoring methods: self-report, observation    Patient's response to intervention:  The patient's response to intervention is accepting.    Progress toward goals:  The patient's progress toward goals is good.          ROS  General ROS: negative  Ophthalmic ROS: negative  ENT ROS: negative  Allergy and Immunology ROS: negative  Hematological and Lymphatic ROS: negative  Endocrine ROS: negative  Respiratory ROS: no cough, shortness of breath, or wheezing  Cardiovascular ROS: no chest pain or dyspnea on exertion  Gastrointestinal ROS: no abdominal pain, change in bowel habits, or black or bloody stools  Genito-Urinary ROS: no dysuria, trouble voiding, or hematuria  Musculoskeletal ROS: negative  Neurological ROS: no TIA or stroke symptoms  Dermatological ROS: negative    PAST MEDICAL HISTORY   Past Medical History:   Diagnosis Date    Anxiety     Depression     Drug abuse     Fatigue     Hx of psychiatric care     Psychiatric problem     PTSD (post-traumatic stress disorder)     Therapy            PSYCHOTROPIC MEDICATIONS   Scheduled Meds:   buPROPion  150 mg Oral Daily    diazePAM  5 mg Oral QID WAKE    multivitamin  1 tablet Oral Daily     Continuous  "Infusions:   PRN Meds:.acetaminophen, aluminum-magnesium hydroxide-simethicone, docusate sodium, hydrOXYzine pamoate, loperamide, nicotine, olanzapine **AND** olanzapine        EXAMINATION    VITALS   Vitals:    07/09/17 0800   BP: 121/61   Pulse: 76   Resp: 18   Temp: 96.2 °F (35.7 °C)       CONSTITUTIONAL  General Appearance: WM, in casual garb; NAD     MUSCULOSKELETAL  Muscle Strength and Tone:  normal  Abnormal Involuntary Movements:  none  Gait and Station:  normal; non-ataxic     PSYCHIATRIC   Level of Consciousness: awake, alert  Orientation: p/p/t/s  Grooming: decreased and inadequate to circumstances  Psychomotor Behavior: mild PMA, no PMR  Speech: nl r/t/v/s  Language:  English fluent  Mood: "anxious"  Affect: anxious, restless, dysphoric  Thought Process:  linear and organized  Associations:  intact; no JEISON  Thought Content:  denied AVH/delusions; denied HI, +SI  Memory:  intact to recent and remote events  Attention:  intact to conversation; not distractible   Fund of Knowledge:  age and education appropriate  Estimate if Intelligence:  average based on work/education history, vocabulary and mental status exam  Insight:  good- seeks help, recognizes illness  Judgment:   good- no bx issues, compliant and cooperative    DIAGNOSTIC TESTING   Laboratory Results  No results found for this or any previous visit (from the past 24 hour(s)).      ASSESSMENT      IMPRESSION   MDD, recurrent, severe without psychotic features  ARMANI     Polysubstance Dependence (opiates, sedative hypnotics, amphetamines, cocaine, cannabis; severe, continuous, with physiological dependence)  Nicotine Dependence      Opiate, Meth and Sedative Hypnotic withdrawal           MEDICAL DECISION MAKING        PROBLEM LIST AND MANAGEMENT PLANS   Depression  Anxiety  Substance/nicotine use  withdrawal     PRESCRIPTION DRUG MANAGEMENT  Compliance: yes  Side Effects: no  Regimen Adjustments:   Depression- Wellbutrin  mg po q " day     Anxiety- Wellbutrin off-label as above; vistaril 50 mg po q 6 hours prn     Substance/nicotine use- Wellbutrin as above; patient counseled; inpatient rehab once stable     Withdrawal- valium to 5 mg po TID; will adjust and taper as indicated and able        DIAGNOSTIC TESTING  Labs reviewed     Disposition:  -SW to assist with aftercare planning and collateral  -Once stable discharge home with outpatient follow up care and/or rehab  -Continue inpatient treatment under a PEC and/or CEC for danger to self and grave disability as evident by severe depression with SI and severe substance use problems requiring inpatient detox and stabilization    NEED FOR CONTINUED HOSPITALIZATION  Psychiatric illness continues to pose a potential threat to life or bodily function, of self or others, thereby requiring the need for continued inpatient psychiatric hospitalization: Yes    Protective inpatient pyschiatric hospitalization required while a safe disposition plan is enacted: Yes    Patient stabilized and ready for discharge from inpatient psychiatric unit: No      STAFF:   Cash Sanchez MD  Psychiatry

## 2017-07-09 NOTE — PLAN OF CARE
Problem: Patient Care Overview (Adult)  Goal: Plan of Care Review  Outcome: Ongoing (interventions implemented as appropriate)  Shift note : patient is in the day room with staff and peers . He is interacting with them . He is detoxing safely . He plans on going into rehab for his alcohol abuse . He is taking all ordered medications and is eating all of his meals . In positive mood .

## 2017-07-09 NOTE — PLAN OF CARE
Problem: Patient Care Overview (Adult)  Goal: Plan of Care Review  Outcome: Ongoing (interventions implemented as appropriate)  More visible in the milieu this evening.  Spent some time in the dayroom with peers.  Minimal interaction with peers.  Calm, quiet, withdrawn, cooperative.  Spoke on the phone this evening but wasn't able to get in contact with his girlfriend.  Depressed, sad.  Tolerating meds.  Safety checks ongoing.

## 2017-07-10 LAB
CHOLEST/HDLC SERPL: 2.8 {RATIO}
HDL/CHOLESTEROL RATIO: 35.3 %
HDLC SERPL-MCNC: 173 MG/DL
HDLC SERPL-MCNC: 61 MG/DL
LDLC SERPL CALC-MCNC: 78.4 MG/DL
NONHDLC SERPL-MCNC: 112 MG/DL
TRIGL SERPL-MCNC: 168 MG/DL

## 2017-07-10 PROCEDURE — 25000003 PHARM REV CODE 250: Performed by: PSYCHIATRY & NEUROLOGY

## 2017-07-10 PROCEDURE — 27000339 *HC DAILY SUPPLY KIT

## 2017-07-10 PROCEDURE — 90833 PSYTX W PT W E/M 30 MIN: CPT | Mod: AF,HB,S$PBB, | Performed by: PSYCHIATRY & NEUROLOGY

## 2017-07-10 PROCEDURE — 99233 SBSQ HOSP IP/OBS HIGH 50: CPT | Mod: AF,HB,S$PBB, | Performed by: PSYCHIATRY & NEUROLOGY

## 2017-07-10 PROCEDURE — 11400000 HC PSYCH PRIVATE ROOM

## 2017-07-10 PROCEDURE — 36415 COLL VENOUS BLD VENIPUNCTURE: CPT

## 2017-07-10 RX ADMIN — DIAZEPAM 5 MG: 5 TABLET ORAL at 01:07

## 2017-07-10 RX ADMIN — DIAZEPAM 5 MG: 5 TABLET ORAL at 06:07

## 2017-07-10 RX ADMIN — DIAZEPAM 5 MG: 5 TABLET ORAL at 09:07

## 2017-07-10 RX ADMIN — THERA TABS 1 TABLET: TAB at 08:07

## 2017-07-10 RX ADMIN — BUPROPION HYDROCHLORIDE 150 MG: 150 TABLET, EXTENDED RELEASE ORAL at 08:07

## 2017-07-10 NOTE — PLAN OF CARE
"Problem: Patient Care Overview (Adult)  Goal: Interdisciplinary Rounds/Family Conf  Treatment Team Update:    Chief Complaint:  Efren Babin is a 47 y.o. male with a past psychiatric history of mood disorder, anxiety and substance use, currently admitted to the inpatient unit with the following chief complaint: depression and addiction    Review of Progress/Goals:  I'm getting agitated. Not knowing what I want to do. When I used the phone and some other isidro was yelling. Tried to use the phone today, and had to wait.     I still have a life too, things I have to take care of." Patient states his goal for getting sober is his daughters, job opportunities where I can make six figures.  "I haven't used heroin going on 2 weeks, meth several days. Right now I don/t have any urges to use drugs. Pissed at myself for getting into the situation."      Affect/Mood:  Arrogant,     Thought Process:  Linear     Medication Current/Changes:  Visteril 50mg   Valium Taper     Revisions to Goals:    Estimated LOS:  3-7 days     Discharge Plan:  Rehab     Referrals:  Rehab                  "

## 2017-07-10 NOTE — PLAN OF CARE
Patient alternating between dayroom and own room.  Awake, alert and oriented.  Negative attitude noted at times.  Clutter-free environment maintained.  Will continue to monitor for safety.

## 2017-07-10 NOTE — PLAN OF CARE
Problem: Patient Care Overview (Adult)  Goal: Interdisciplinary Rounds/Family Conf  Outcome: Ongoing (interventions implemented as appropriate)  Patient cooperative. Negative attitude at times with unit rules. Interacts well with peers. Vital signs stable. No signs/symptoms of withdrawal. Accepting meals and snacks. Medication compliant. Safety precautions maintained.

## 2017-07-10 NOTE — PLAN OF CARE
Problem: Patient Care Overview (Adult)  Goal: Discharge Needs Assessment  Patient to discharge to rehab. Patient will FU with [facility].

## 2017-07-10 NOTE — PLAN OF CARE
Problem: Patient Care Overview (Adult)  Goal: Plan of Care Review  Outcome: Ongoing (interventions implemented as appropriate)  Pt has slept 7.5 hours with one interruption so far.  NAD.  Resp even & unlabored.  Pathways clear and bed is low.  Q 15 minute safety checks ongoing.  All precautions maintained.

## 2017-07-10 NOTE — PSYCH
"    Chief Complaint:   I'm drug addict and I was thinking of taking my life. I know better. I was raised Religion. I have 2 daughters I love. I had no where else to turn and I felt all alone. I knew I had to do something, so I came here.   Patient states he lives alone. States his ex-mother in law brought him to the hospital. Patient states he has supports, but it depend on what he needs. My brother in law is good for spiritual support. His sisters, girlfriend and daughters are his other supports.   Patient reports he ODd in May and his daughter had to give him CPR.    Patient states he wants to go rehab, but then states he has to get back to work to provide for his daughters and nothing else matters.   Patient states he used to have a temper but learned to control it, "but the last few days my fuse is shorter than it used to be." Patient states he has anxiety and worries a lot. Is concerned about numerous relationships- states his ex mother in law kicked him out of her life, but did bring him to the hospital, he step son whom he raised is not talking to him now.   Patient report  services in the [employer] in [place]. States he was kicked out, but received a general discharge under honorable conditions.      Pt Age/Gender/Appearance/Psych History/Symptoms and Duration:  Dat is a 46 yo male male with a past psychiatric history of mood disorder, anxiety and substance use, currently admitted to the inpatient unit with the following chief complaint: depression and addiction      Suicidal Ideations/Plan/Attempt History/Risk and Protective Factors:  Patient denies. States he has had thoughts, but due to his Adventist background and love for his children, he would not, that is why he sought help.    I've wanted to kill myself a million times. I know better. Patient reports previous Heroin overdose in May    Substance Abuse History/UTOX:  Patient had a positive UTOX for cocaine, barbituates, amphetamine and " "THC. Patient states he has been using drugs "Off and on all my life."   I was in the  at 18, went to [place], drank, did some drugs. I was out in 1994. It hit me head on - mostly non stop." Patient states there were moments when he did good.  I had a Good job for 10 yrs.  States in 2009 he tried synthetics, bath salts. He went to rehab in 2013 for 6 mos ([facility] in [place], when he had insurance). He was clean for two weeks.   "it was a good rehab. They gave me tools I just have to use them.   Patient states in 2015 til last July he was laid off from [employer].   "I Wasn't drinking or doing drugs.I worked in [employer]- then I went through a divorce,  laid off. My life went to [facility] this last year.   Patient states he OD'd a couple months ago. "I dont think I did it on purpose. I had girlfriend with me. She said I was having real bad day that day. I snorted a gram and half at one time. I dont remember any of it.   Patient notes his trigger is  "I like it"   States he and girlfriend were trying to get into rehab. "She had insurance, so she got a place. We talk everyday."        Sleep/Appetite Quality:  Reports Not sleeping well.   States he has lost 50 pounds since last year.     Compliance/Legal History/Issues:  Court date [facility] C - he thinks July 21  No        Abuse Concerns:  None     Cultural/[place] Values/Beliefs:  Raised Jew       Supports/Marital Status/Quality of Interpersonal Relationships:  Gf; kids       Initial Discharge Plan:  D/C to rehab   [facility]               "

## 2017-07-10 NOTE — PLAN OF CARE
Problem: Patient Care Overview (Adult)  Goal: Plan of Care Review  Outcome: Ongoing (interventions implemented as appropriate)  Up and about the unit.  Speaking on phone frequently this evening.  Out of assigned room more than yesterday evening.  Does interact well with one peer and demonstrates spontaneous conversation.  Calm, cooperative, pleasant.  Tolerating valium taper.  No s/s withdrawal.  VS WNL.  Good appetite.  Safety checks ongoing.

## 2017-07-10 NOTE — PROGRESS NOTES
PSYCHIATRY DAILY INPATIENT PROGRESS NOTE  SUBSEQUENT HOSPITAL VISIT    ENCOUNTER DATE: 7/10/2017  [facility]    DATE OF ADMISSION: 7/7/2017 12:04 PM  LENGTH OF STAY: 3 days      HISTORY    CHIEF COMPLAINT   Efren Babin is a 47 y.o. male, seen during daily portillo rounds on the inpatient unit.  Efren Babin presents with the chief complaint of depression and addiction    HPI   (Elements: Location, Quality, Severity, Duration, Timing, Content, Modifying Factors, Associated Signs & Symptoms)    The patient was seen and examined. The chart was reviewed.    Staff reports no behavioral or management issues.     The patient has been compliant with treatment. The patient denied any side effects.    Symptoms remain minimally changed as documented below- he has continued s/s of withdrawal exacerbating mood/anxiety symptoms as documented    Continued Symptoms of Depression: +diminished mood or loss of interest/anhedonia; positive for irritability, diminished energy, change in sleep, change in appetite, diminished concentration or cognition or indecisiveness, PMA/R, excessive guilt or hopelessness or worthlessness, and suicidal ideations     Continued Changes in Sleep: +trouble with initiation/maintenance, no early morning awakening with inability to return to sleep     Continued but less Suicidal/(no)Homicidal ideations: +active/passive ideations, no organized plans, no future intentions     Denied Symptoms of psychosis: no hallucinations, delusions, disorganized thinking, disorganized behavior or abnormal motor behavior, or negative symptoms      Denied Symptoms of crow or hypomania: no elevated, expansive, or irritable mood with no increased energy or activity; with inflated self-esteem or grandiosity, decreased need for sleep, increased rate of speech, FOI or racing thoughts, distractibility, increased goal directed activity or PMA, or risky/disinhibited behavior     Continued Symptoms of ARMANI:  +excessive anxiety/worry/fear, +more days than not, +about numerous issues, +difficult to control, with restlessness, fatigue, poor concentration, irritability, muscle tension, and sleep disturbance; +causes functionally impairing distress     Continue s/s of withdrawal with dysphoria, restlessness, irritability and malaise. He is tolerating the valium taper well.     He did require vistaril for uncontrolled anxiety.      PSYCHOTHERAPY ADD-ON +17761   30 (16-37*) minutes    Time: 16 minutes  Participants: Met with patient    Therapeutic Intervention Type: behavior modifying psychotherapy, supportive psychotherapy  Why chosen therapy is appropriate versus another modality: relevant to diagnosis, patient responds to this modality, evidence based practice    Target symptoms: depression, anxiety , substance abuse  Primary focus: substance use  Psychotherapeutic techniques: supportive, behavioral, cognitive and motivational techniques; psycho-education     Outcome monitoring methods: self-report, observation    Patient's response to intervention:  The patient's response to intervention is accepting.    Progress toward goals:  The patient's progress toward goals is good.          ROS  General ROS: negative  Ophthalmic ROS: negative  ENT ROS: negative  Allergy and Immunology ROS: negative  Hematological and Lymphatic ROS: negative  Endocrine ROS: negative  Respiratory ROS: no cough, shortness of breath, or wheezing  Cardiovascular ROS: no chest pain or dyspnea on exertion  Gastrointestinal ROS: no abdominal pain, change in bowel habits, or black or bloody stools  Genito-Urinary ROS: no dysuria, trouble voiding, or hematuria  Musculoskeletal ROS: negative  Neurological ROS: no TIA or stroke symptoms  Dermatological ROS: negative    PAST MEDICAL HISTORY   Past Medical History:   Diagnosis Date    Anxiety     Depression     Drug abuse     Fatigue     Hx of psychiatric care     Psychiatric problem     PTSD  "(post-traumatic stress disorder)     Therapy            PSYCHOTROPIC MEDICATIONS   Scheduled Meds:   buPROPion  150 mg Oral Daily    diazePAM  5 mg Oral TID    multivitamin  1 tablet Oral Daily     Continuous Infusions:   PRN Meds:.acetaminophen, aluminum-magnesium hydroxide-simethicone, docusate sodium, hydrOXYzine pamoate, loperamide, nicotine, olanzapine **AND** olanzapine        EXAMINATION    VITALS   Vitals:    07/10/17 0826   BP: 110/72   Pulse: 73   Resp: 20   Temp: 98 °F (36.7 °C)       CONSTITUTIONAL  General Appearance: WM, in casual garb; NAD     MUSCULOSKELETAL  Muscle Strength and Tone:  normal  Abnormal Involuntary Movements:  none  Gait and Station:  normal; non-ataxic     PSYCHIATRIC   Level of Consciousness: awake, alert  Orientation: p/p/t/s  Grooming: decreased and inadequate to circumstances  Psychomotor Behavior: mild PMA, no PMR  Speech: nl r/t/v/s  Language:  English fluent  Mood: "anxious"  Affect: anxious, restless, dysphoric, irritable  Thought Process:  linear and organized  Associations:  intact; no JEISON  Thought Content:  denied AVH/delusions; denied HI, +SI  Memory:  intact to recent and remote events  Attention:  intact to conversation; not distractible   Fund of Knowledge:  age and education appropriate  Estimate if Intelligence:  average based on work/education history, vocabulary and mental status exam  Insight:  good- seeks help, recognizes illness  Judgment:   good- no bx issues, compliant and cooperative    DIAGNOSTIC TESTING   Laboratory Results  No results found for this or any previous visit (from the past 24 hour(s)).      ASSESSMENT      IMPRESSION   MDD, recurrent, severe without psychotic features  ARMANI     Polysubstance Dependence (opiates, sedative hypnotics, amphetamines, cocaine, cannabis; severe, continuous, with physiological dependence)  Nicotine Dependence      Opiate, Meth and Sedative Hypnotic withdrawal           MEDICAL DECISION MAKING        PROBLEM LIST " AND MANAGEMENT PLANS   Depression  Anxiety  Substance/nicotine use  withdrawal     PRESCRIPTION DRUG MANAGEMENT  Compliance: yes  Side Effects: no  Regimen Adjustments:   Depression- Wellbutrin  mg po q day (will plan to increase to 300 mg on Wednesday)     Anxiety- Wellbutrin off-label as above; vistaril 50 mg po q 6 hours prn     Substance/nicotine use- Wellbutrin as above; patient counseled; inpatient rehab once stable     Withdrawal- valium decreased to 5 mg po TID; will adjust and taper as indicated and able        DIAGNOSTIC TESTING  Labs reviewed     Disposition:  -SW to assist with aftercare planning and collateral  -Once stable discharge home with outpatient follow up care and/or rehab  -Continue inpatient treatment under a [facility] and/or [facility] for danger to self and grave disability as evident by severe depression with SI and severe substance use problems requiring inpatient detox and stabilization    NEED FOR CONTINUED HOSPITALIZATION  Psychiatric illness continues to pose a potential threat to life or bodily function, of self or others, thereby requiring the need for continued inpatient psychiatric hospitalization: Yes    Protective inpatient pyschiatric hospitalization required while a safe disposition plan is enacted: Yes    Patient stabilized and ready for discharge from inpatient psychiatric unit: No      STAFF:   Cash Sanchez MD  Psychiatry

## 2017-07-10 NOTE — PLAN OF CARE
Problem: Overarching Goals (Adult)  Goal: Develops/Participates in Therapeutic [place] to Support Successful Transition  Group Note    Behavior:  Patient attended Psychotherapy Group and participated. Patient presents with irritable mood, constricted affect. Patient was aggravated by another patient's rambling.     Intervention:  Self Portrait - patients armaan their self portrait, image depicting how they felt about themselves. Discussed drawings and the importance and benefits of having a positive self image vs a negative self image.      Response:  Patient armaan a question lexie and later added a heart, sad face and 'thinking' face. Patient states right now he is all over the place, dealing with sadness anger and guilt. Patient is mostly concerned about his daughters and doing well for them. Patient states he is trying to think differently and have a more positive self image.     Plan:  Will continue to encourage patient participation in group.

## 2017-07-11 VITALS
HEIGHT: 74 IN | WEIGHT: 203 LBS | HEART RATE: 72 BPM | TEMPERATURE: 97 F | BODY MASS INDEX: 26.05 KG/M2 | RESPIRATION RATE: 16 BRPM | DIASTOLIC BLOOD PRESSURE: 56 MMHG | SYSTOLIC BLOOD PRESSURE: 113 MMHG

## 2017-07-11 PROBLEM — F32.A DEPRESSION WITH SUICIDAL IDEATION: Status: RESOLVED | Noted: 2017-07-07 | Resolved: 2017-07-11

## 2017-07-11 PROBLEM — R45.851 DEPRESSION WITH SUICIDAL IDEATION: Status: RESOLVED | Noted: 2017-07-07 | Resolved: 2017-07-11

## 2017-07-11 PROCEDURE — 90833 PSYTX W PT W E/M 30 MIN: CPT | Mod: AF,HB,S$PBB, | Performed by: PSYCHIATRY & NEUROLOGY

## 2017-07-11 PROCEDURE — 25000003 PHARM REV CODE 250: Performed by: PSYCHIATRY & NEUROLOGY

## 2017-07-11 PROCEDURE — 99239 HOSP IP/OBS DSCHRG MGMT >30: CPT | Mod: AF,HB,S$PBB, | Performed by: PSYCHIATRY & NEUROLOGY

## 2017-07-11 RX ORDER — IBUPROFEN 200 MG
1 TABLET ORAL DAILY PRN
Refills: 0 | Status: ON HOLD | COMMUNITY
Start: 2017-07-11 | End: 2017-07-21 | Stop reason: HOSPADM

## 2017-07-11 RX ORDER — BUPROPION HYDROCHLORIDE 150 MG/1
150 TABLET ORAL DAILY
Qty: 30 TABLET | Refills: 1 | Status: ON HOLD | OUTPATIENT
Start: 2017-07-11 | End: 2017-07-21 | Stop reason: HOSPADM

## 2017-07-11 RX ADMIN — DIAZEPAM 5 MG: 5 TABLET ORAL at 05:07

## 2017-07-11 RX ADMIN — BUPROPION HYDROCHLORIDE 150 MG: 150 TABLET, EXTENDED RELEASE ORAL at 08:07

## 2017-07-11 RX ADMIN — THERA TABS 1 TABLET: TAB at 08:07
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
## 2017-07-11 NOTE — PSYCH
Patient will be following up with [facility] at [address] [place], [place] [address] in [place], [phone].  Appointment on 7/14/2017 at 9:00 am.  Patient will receive a tobacco cessation appointment at mentioned appointment.  AVS and discharge summary faxed on 7/11/17 at 11:19 am

## 2017-07-11 NOTE — PLAN OF CARE
Problem: Patient Care Overview (Adult)  Goal: Plan of Care Review  Outcome: Ongoing (interventions implemented as appropriate)  Plan of care reviewed with patient.  Denies intent to harm self or others.  Accepts meals, compliant with medications.  Gait steady, no falls.  Easily frustrated.  Promoted an individualized safety plan, effective coping strategies, impulse control, and motivators to resolve depression and improve mood.  Will continue to monitor for safety.

## 2017-07-11 NOTE — PSYCH
Patient is medically cleared for discharge. Patient is not gravely disabled or a threat to self/others. Patient compliant and cooperative with discharge education. No suicidal/homicidal thoughts or negative emotions at this time. Pain score 0/10. No concerns or questions. Patient has his personal vehicle in [facility] parking lot for discharge. Patient will be moving to [place], and living with his parents.

## 2017-07-11 NOTE — PLAN OF CARE
Problem: Patient Care Overview (Adult)  Goal: Plan of Care Review  Outcome: Ongoing (interventions implemented as appropriate)  Pt has slept 7 hours with 2 interruptions so far.  NAD.  Resp even & unlabored.  Pathways clear and bed is low.  Q 15 minute safety checks ongoing.  All precautions maintained.

## 2017-07-11 NOTE — PROGRESS NOTES
PSYCHOTHERAPY ADD-ON +[id number]   30 (16-37*) minutes     Time: 16 minutes  Participants: Met with patient     Therapeutic Intervention Type: behavior modifying psychotherapy, supportive psychotherapy  Why chosen therapy is appropriate versus another modality: relevant to diagnosis, patient responds to this modality, evidence based practice     Target symptoms: depression, anxiety , substance abuse  Primary focus: substance use  Psychotherapeutic techniques: supportive, behavioral, cognitive and motivational techniques; psycho-education; managing cravings      Outcome monitoring methods: self-report, observation     Patient's response to intervention:  The patient's response to intervention is accepting.     Progress toward goals:  The patient's progress toward goals is good    Cash Sanchez MD  Psychiatry

## 2017-07-17 ENCOUNTER — HOSPITAL ENCOUNTER (EMERGENCY)
Facility: HOSPITAL | Age: 47
Discharge: PSYCHIATRIC OR SUBSTANCE USE FACILITY | End: 2017-07-17
Attending: SURGERY
Payer: MEDICAID

## 2017-07-17 VITALS
RESPIRATION RATE: 18 BRPM | HEART RATE: 82 BPM | TEMPERATURE: 97 F | BODY MASS INDEX: 26.06 KG/M2 | SYSTOLIC BLOOD PRESSURE: 105 MMHG | WEIGHT: 203 LBS | OXYGEN SATURATION: 98 % | DIASTOLIC BLOOD PRESSURE: 71 MMHG

## 2017-07-17 DIAGNOSIS — T50.902A OVERDOSE, INTENTIONAL SELF-HARM, INITIAL ENCOUNTER: ICD-10-CM

## 2017-07-17 DIAGNOSIS — R45.851 DEPRESSION WITH SUICIDAL IDEATION: Primary | ICD-10-CM

## 2017-07-17 DIAGNOSIS — F32.A DEPRESSION WITH SUICIDAL IDEATION: Primary | ICD-10-CM

## 2017-07-17 PROBLEM — F33.2 SEVERE RECURRENT MAJOR DEPRESSION WITHOUT PSYCHOTIC FEATURES: Status: ACTIVE | Noted: 2017-07-17

## 2017-07-17 LAB
25(OH)D3+25(OH)D2 SERPL-MCNC: 72 NG/ML
ALBUMIN SERPL BCP-MCNC: 4.2 G/DL
ALP SERPL-CCNC: 80 U/L
ALT SERPL W/O P-5'-P-CCNC: 20 U/L
AMPHET+METHAMPHET UR QL: ABNORMAL
ANION GAP SERPL CALC-SCNC: 11 MMOL/L
APAP SERPL-MCNC: <3 UG/ML
AST SERPL-CCNC: 28 U/L
BACTERIA #/AREA URNS HPF: NORMAL /HPF
BARBITURATES UR QL SCN>200 NG/ML: NEGATIVE
BASOPHILS # BLD AUTO: 0.05 K/UL
BASOPHILS NFR BLD: 0.5 %
BENZODIAZ UR QL SCN>200 NG/ML: ABNORMAL
BILIRUB SERPL-MCNC: 0.9 MG/DL
BILIRUB UR QL STRIP: ABNORMAL
BUN SERPL-MCNC: 17 MG/DL
BZE UR QL SCN: NEGATIVE
CALCIUM SERPL-MCNC: 9.8 MG/DL
CANNABINOIDS UR QL SCN: ABNORMAL
CHLORIDE SERPL-SCNC: 106 MMOL/L
CLARITY UR: CLEAR
CO2 SERPL-SCNC: 25 MMOL/L
COLOR UR: YELLOW
CREAT SERPL-MCNC: 1.4 MG/DL
CREAT UR-MCNC: >450 MG/DL
DIFFERENTIAL METHOD: ABNORMAL
EOSINOPHIL # BLD AUTO: 0.1 K/UL
EOSINOPHIL NFR BLD: 1.1 %
ERYTHROCYTE [DISTWIDTH] IN BLOOD BY AUTOMATED COUNT: 13.2 %
EST. GFR  (AFRICAN AMERICAN): >60 ML/MIN/1.73 M^2
EST. GFR  (NON AFRICAN AMERICAN): 59 ML/MIN/1.73 M^2
ETHANOL SERPL-MCNC: <10 MG/DL
FOLATE SERPL-MCNC: 15.2 NG/ML
GLUCOSE SERPL-MCNC: 96 MG/DL
GLUCOSE UR QL STRIP: NEGATIVE
HCT VFR BLD AUTO: 41.2 %
HGB BLD-MCNC: 13.9 G/DL
HGB UR QL STRIP: NEGATIVE
HYALINE CASTS #/AREA URNS LPF: 0 /LPF
KETONES UR QL STRIP: ABNORMAL
LEUKOCYTE ESTERASE UR QL STRIP: NEGATIVE
LYMPHOCYTES # BLD AUTO: 1.9 K/UL
LYMPHOCYTES NFR BLD: 18.8 %
MCH RBC QN AUTO: 31 PG
MCHC RBC AUTO-ENTMCNC: 33.7 %
MCV RBC AUTO: 92 FL
METHADONE UR QL SCN>300 NG/ML: NEGATIVE
MICROSCOPIC COMMENT: NORMAL
MONOCYTES # BLD AUTO: 0.8 K/UL
MONOCYTES NFR BLD: 8.4 %
NEUTROPHILS # BLD AUTO: 7 K/UL
NEUTROPHILS NFR BLD: 71.2 %
NITRITE UR QL STRIP: NEGATIVE
OPIATES UR QL SCN: NEGATIVE
PCP UR QL SCN>25 NG/ML: NEGATIVE
PH UR STRIP: 6 [PH] (ref 5–8)
PLATELET # BLD AUTO: 313 K/UL
PMV BLD AUTO: 10 FL
POTASSIUM SERPL-SCNC: 4 MMOL/L
PROT SERPL-MCNC: 7.9 G/DL
PROT UR QL STRIP: ABNORMAL
RBC # BLD AUTO: 4.48 M/UL
RBC #/AREA URNS HPF: 0 /HPF (ref 0–4)
SALICYLATES SERPL-MCNC: <5 MG/DL
SODIUM SERPL-SCNC: 142 MMOL/L
SP GR UR STRIP: >=1.03 (ref 1–1.03)
T3FREE SERPL-MCNC: 2.5 PG/ML
T4 FREE SERPL-MCNC: 1 NG/DL
TOXICOLOGY INFORMATION: ABNORMAL
TSH SERPL DL<=0.005 MIU/L-ACNC: 1.3 UIU/ML
URN SPEC COLLECT METH UR: ABNORMAL
UROBILINOGEN UR STRIP-ACNC: 1 EU/DL
VIT B12 SERPL-MCNC: 599 PG/ML
WBC # BLD AUTO: 9.87 K/UL
WBC #/AREA URNS HPF: 0 /HPF (ref 0–5)

## 2017-07-17 PROCEDURE — 80053 COMPREHEN METABOLIC PANEL: CPT

## 2017-07-17 PROCEDURE — 80329 ANALGESICS NON-OPIOID 1 OR 2: CPT

## 2017-07-17 PROCEDURE — 82607 VITAMIN B-12: CPT

## 2017-07-17 PROCEDURE — 80307 DRUG TEST PRSMV CHEM ANLYZR: CPT

## 2017-07-17 PROCEDURE — 84481 FREE ASSAY (FT-3): CPT

## 2017-07-17 PROCEDURE — 81000 URINALYSIS NONAUTO W/SCOPE: CPT

## 2017-07-17 PROCEDURE — 85025 COMPLETE CBC W/AUTO DIFF WBC: CPT

## 2017-07-17 PROCEDURE — 84439 ASSAY OF FREE THYROXINE: CPT

## 2017-07-17 PROCEDURE — 82306 VITAMIN D 25 HYDROXY: CPT

## 2017-07-17 PROCEDURE — 84443 ASSAY THYROID STIM HORMONE: CPT

## 2017-07-17 PROCEDURE — 82746 ASSAY OF FOLIC ACID SERUM: CPT

## 2017-07-17 PROCEDURE — 36415 COLL VENOUS BLD VENIPUNCTURE: CPT

## 2017-07-17 PROCEDURE — 80320 DRUG SCREEN QUANTALCOHOLS: CPT

## 2017-07-17 PROCEDURE — 99285 EMERGENCY DEPT VISIT HI MDM: CPT

## 2017-07-17 RX ORDER — LORAZEPAM 2 MG/ML
2 INJECTION INTRAMUSCULAR EVERY 4 HOURS PRN
Status: DISCONTINUED | OUTPATIENT
Start: 2017-07-17 | End: 2017-07-17 | Stop reason: HOSPADM

## 2017-07-17 RX ORDER — HALOPERIDOL 5 MG/ML
5 INJECTION INTRAMUSCULAR EVERY 4 HOURS PRN
Status: DISCONTINUED | OUTPATIENT
Start: 2017-07-17 | End: 2017-07-17 | Stop reason: HOSPADM

## 2017-07-17 RX ORDER — DIPHENHYDRAMINE HYDROCHLORIDE 50 MG/ML
50 INJECTION INTRAMUSCULAR; INTRAVENOUS EVERY 4 HOURS PRN
Status: DISCONTINUED | OUTPATIENT
Start: 2017-07-17 | End: 2017-07-17 | Stop reason: HOSPADM

## 2017-07-17 NOTE — ED PROVIDER NOTES
[facility]                                        July 17, 2017                   Chief Complaint  47 y.o. male with Drug Overdose    History of Present Illness  Efren Babin presents to the emergency room with drug overdose today  Patient took excess clonidine intent to kill himself per ER interview this afternoon  Patient is alert and coherent, history of drug addiction and suicidal ideation noted  Patient states that he wanted in his life today, not paranoid and delusional now  Patient was under inpatient therapy just recently, relapsed into drug abuse on DC    The history is provided by the patient    Past Medical History   -- Anxiety    -- Depression    -- Drug abuse    -- Fatigue    -- Hx of psychiatric care    -- Psychiatric problem    -- PTSD (post-traumatic stress disorder)    -- Therapy       Surgical history: Appendectomy, hernia repair, vasectomy  No Known Allergies   No family history on file.    Review of Systems and Physical Exam     Review of Systems  -- Constitution - no fever, denies fatigue, no weakness, no chills  -- Eyes - no tearing or redness, no visual disturbance  -- Ear, Nose - no tinnitus or earache, no nasal congestion or discharge  -- Mouth,Throat - no sore throat, no toothache, normal voice, normal swallowing  -- Respiratory - denies cough and congestion, no shortness of breath, no ORTIZ  -- Cardiovascular - denies chest pain, no palpitations, denies claudication  -- Gastrointestinal - denies abdominal pain, nausea, vomiting, or diarrhea  -- Musculoskeletal - denies back pain, negative for myalgias and arthralgias   -- Neurological - no headache, denies weakness or seizure; no LOC  -- Psychiatric - suicidal ideation, depression, no psychosis    Vital Signs  -- His blood pressure is 124/66 and his pulse is 105.   -- His respiration is 20 and oxygen saturation is 99%.      Physical Exam  -- Nursing note and vitals reviewed  -- Constitutional: Appears  well-developed and well-nourished  -- Head: Atraumatic. Normocephalic. No obvious abnormality  -- Eyes: Pupils are equal and reactive to light. Normal conjunctiva and lids  -- Cardiac: Normal rate, regular rhythm and normal heart sounds  -- Pulmonary: Normal respiratory effort, breath sounds clear to auscultation  -- Abdominal: Soft, no tenderness. Normal bowel sounds. Normal liver edge  -- Musculoskeletal: Normal range of motion, no effusions. Joints stable   -- Neurological: No focal deficits. Showed good interaction with staff    Emergency Room Course     Diagnosis  -- Depression with suicidal ideation  -- Overdose, intentional self-harm, initial encounter     Disposition and Plan  -- Disposition: PEC  -- Condition: stable  -- Pt will be placed in a psychiatric facility  -- The patient is a direct observation until placement  -- The patient has been made aware of his or her rights while under PEC in the ER  -- All questions have been answered; will follow ER protocols until placement    Lab work was performed in the ER, this patient is cleared for psychiatric placement     This note is dictated on Dragon Natural Speaking word recognition program.  There are word recognition mistakes that are occasionally missed on review.           René Browne MD  07/17/17 4994

## 2017-07-17 NOTE — ED NOTES
The patient is resting comfortably with eyes closed, easily arousable. Airway is open and patent, respirations are spontaneous, normal respiratory effort and rate noted, skin warm and dry, full ROM in all extremities, appearance: no apparent distress noted, resting comfortably.  No change from previous assessment.  Bed in low, locked position, SR x2, HOB 30 degrees.  Pt able to change position independently.  Sitter present at bedside.  Will continue to monitor.

## 2017-07-17 NOTE — ED TRIAGE NOTES
Assumed care of 47 y.o. male presents to ER ED 03 /ED 03A   Chief Complaint   Patient presents with    Drug Overdose    pt took unknown amount of clonidine 0.1mg, six hours ago. VSS. No acute distress noted.

## 2017-07-19 PROBLEM — F15.20 AMPHETAMINE USE DISORDER, SEVERE, DEPENDENCE: Status: ACTIVE | Noted: 2017-07-19

## 2018-01-30 ENCOUNTER — HOSPITAL ENCOUNTER (EMERGENCY)
Facility: HOSPITAL | Age: 48
Discharge: HOME | End: 2018-01-30
Attending: SURGERY
Payer: MEDICAID

## 2018-01-30 VITALS
SYSTOLIC BLOOD PRESSURE: 125 MMHG | WEIGHT: 204 LBS | RESPIRATION RATE: 20 BRPM | DIASTOLIC BLOOD PRESSURE: 87 MMHG | HEART RATE: 110 BPM | OXYGEN SATURATION: 96 % | TEMPERATURE: 99 F | BODY MASS INDEX: 24.19 KG/M2

## 2018-01-30 DIAGNOSIS — J11.1 INFLUENZA: Primary | ICD-10-CM

## 2018-01-30 DIAGNOSIS — R11.2 NON-INTRACTABLE VOMITING WITH NAUSEA, UNSPECIFIED VOMITING TYPE: ICD-10-CM

## 2018-01-30 DIAGNOSIS — R05.9 COUGH: ICD-10-CM

## 2018-01-30 LAB
ALBUMIN SERPL BCP-MCNC: 4.1 G/DL
ALP SERPL-CCNC: 67 U/L
ALT SERPL W/O P-5'-P-CCNC: 39 U/L
ANION GAP SERPL CALC-SCNC: 9 MMOL/L
AST SERPL-CCNC: 33 U/L
BASOPHILS # BLD AUTO: 0.01 K/UL
BASOPHILS NFR BLD: 0.2 %
BILIRUB SERPL-MCNC: 0.5 MG/DL
BUN SERPL-MCNC: 17 MG/DL
CALCIUM SERPL-MCNC: 9.1 MG/DL
CHLORIDE SERPL-SCNC: 99 MMOL/L
CO2 SERPL-SCNC: 27 MMOL/L
CREAT SERPL-MCNC: 1.2 MG/DL
DIFFERENTIAL METHOD: ABNORMAL
EOSINOPHIL # BLD AUTO: 0 K/UL
EOSINOPHIL NFR BLD: 0 %
ERYTHROCYTE [DISTWIDTH] IN BLOOD BY AUTOMATED COUNT: 13.3 %
EST. GFR  (AFRICAN AMERICAN): >60 ML/MIN/1.73 M^2
EST. GFR  (NON AFRICAN AMERICAN): >60 ML/MIN/1.73 M^2
FLUAV AG SPEC QL IA: NEGATIVE
FLUBV AG SPEC QL IA: POSITIVE
GLUCOSE SERPL-MCNC: 105 MG/DL
HCT VFR BLD AUTO: 40.9 %
HGB BLD-MCNC: 14.1 G/DL
LYMPHOCYTES # BLD AUTO: 0.8 K/UL
LYMPHOCYTES NFR BLD: 13.7 %
MCH RBC QN AUTO: 31.1 PG
MCHC RBC AUTO-ENTMCNC: 34.5 G/DL
MCV RBC AUTO: 90 FL
MONOCYTES # BLD AUTO: 0.8 K/UL
MONOCYTES NFR BLD: 14.2 %
NEUTROPHILS # BLD AUTO: 4.1 K/UL
NEUTROPHILS NFR BLD: 71.9 %
PLATELET # BLD AUTO: 196 K/UL
PMV BLD AUTO: 10.2 FL
POTASSIUM SERPL-SCNC: 4.3 MMOL/L
PROT SERPL-MCNC: 7.8 G/DL
RBC # BLD AUTO: 4.53 M/UL
SODIUM SERPL-SCNC: 135 MMOL/L
SPECIMEN SOURCE: ABNORMAL
WBC # BLD AUTO: 5.76 K/UL

## 2018-01-30 PROCEDURE — 99284 EMERGENCY DEPT VISIT MOD MDM: CPT | Mod: 25

## 2018-01-30 PROCEDURE — 36415 COLL VENOUS BLD VENIPUNCTURE: CPT

## 2018-01-30 PROCEDURE — 87400 INFLUENZA A/B EACH AG IA: CPT | Mod: 59

## 2018-01-30 PROCEDURE — 80053 COMPREHEN METABOLIC PANEL: CPT

## 2018-01-30 PROCEDURE — 96374 THER/PROPH/DIAG INJ IV PUSH: CPT

## 2018-01-30 PROCEDURE — 85025 COMPLETE CBC W/AUTO DIFF WBC: CPT

## 2018-01-30 PROCEDURE — 96361 HYDRATE IV INFUSION ADD-ON: CPT

## 2018-01-30 PROCEDURE — 63600175 PHARM REV CODE 636 W HCPCS: Performed by: NURSE PRACTITIONER

## 2018-01-30 PROCEDURE — 25000003 PHARM REV CODE 250: Performed by: NURSE PRACTITIONER

## 2018-01-30 RX ORDER — ONDANSETRON 2 MG/ML
4 INJECTION INTRAMUSCULAR; INTRAVENOUS
Status: COMPLETED | OUTPATIENT
Start: 2018-01-30 | End: 2018-01-30

## 2018-01-30 RX ORDER — SODIUM CHLORIDE 9 MG/ML
1000 INJECTION, SOLUTION INTRAVENOUS
Status: COMPLETED | OUTPATIENT
Start: 2018-01-30 | End: 2018-01-30

## 2018-01-30 RX ORDER — OSELTAMIVIR PHOSPHATE 75 MG/1
75 CAPSULE ORAL 2 TIMES DAILY
Qty: 10 CAPSULE | Refills: 0 | Status: SHIPPED | OUTPATIENT
Start: 2018-01-30 | End: 2018-02-04

## 2018-01-30 RX ORDER — KETOROLAC TROMETHAMINE 30 MG/ML
30 INJECTION, SOLUTION INTRAMUSCULAR; INTRAVENOUS
Status: COMPLETED | OUTPATIENT
Start: 2018-01-30 | End: 2018-01-30

## 2018-01-30 RX ORDER — ONDANSETRON 4 MG/1
4 TABLET, FILM COATED ORAL EVERY 8 HOURS PRN
Qty: 12 TABLET | Refills: 0 | Status: SHIPPED | OUTPATIENT
Start: 2018-01-30 | End: 2018-02-02

## 2018-01-30 RX ADMIN — ONDANSETRON 4 MG: 2 INJECTION INTRAMUSCULAR; INTRAVENOUS at 12:01

## 2018-01-30 RX ADMIN — SODIUM CHLORIDE 1000 ML: 0.9 INJECTION, SOLUTION INTRAVENOUS at 12:01

## 2018-01-30 RX ADMIN — KETOROLAC TROMETHAMINE 30 MG: 30 INJECTION, SOLUTION INTRAMUSCULAR at 12:01

## 2018-01-30 NOTE — ED PROVIDER NOTES
Encounter Date: 1/30/2018       History     Chief Complaint   Patient presents with    URI     The history is provided by the patient.   URI   The primary symptoms include fever, fatigue, headaches, ear pain, sore throat, cough, nausea, vomiting and myalgias. Primary symptoms do not include wheezing, abdominal pain, arthralgias or rash. Illness onset: 3-4 days ago. This is a new problem. The problem has been gradually worsening. The maximum temperature recorded prior to his arrival was 102 to 102.9 F.   The headache began yesterday. Headache is a chronic problem. The headache is present intermittently. The pain from the headache is at a severity of 8/10. Location/region(s) of the headache: bilateral. The headache is associated with coughing and straining. The headache is associated with photophobia. The headache is not associated with eye pain, visual change, paresthesias or loss of balance.     Both ears are affected.   The sore throat is not accompanied by trouble swallowing, drooling, hoarse voice or stridor.   The cough is productive. The sputum is brown, yellow and green.   The nausea is associated with eating.     The vomiting began 2 days ago. Frequency: 3-4 times total since onset.     The myalgias are generalized. The myalgias are aching. The myalgias are not associated with weakness, tenderness or swelling.   The onset of the illness is associated with exposure to sick contacts. Symptoms associated with the illness include chills, sinus pressure, congestion and rhinorrhea.     Review of patient's allergies indicates:  No Known Allergies  Past Medical History:   Diagnosis Date    Anxiety     Depression     Drug abuse     Fatigue     Hx of psychiatric care     Psychiatric problem     PTSD (post-traumatic stress disorder)     Therapy      Past Surgical History:   Procedure Laterality Date    APPENDECTOMY      HERNIA REPAIR      VASECTOMY  2012     History reviewed. No pertinent family  history.  Social History   Substance Use Topics    Smoking status: Former Smoker     Types: Cigarettes    Smokeless tobacco: Former User     Types: Chew     Quit date: 7/3/2017    Alcohol use 3.6 oz/week     6 Cans of beer per week      Comment: seldom     Review of Systems   Constitutional: Positive for appetite change, chills, fatigue and fever.   HENT: Positive for congestion, ear pain, postnasal drip, rhinorrhea, sinus pressure and sore throat. Negative for dental problem, drooling, hoarse voice and trouble swallowing.    Eyes: Positive for photophobia. Negative for pain, discharge, redness and visual disturbance.   Respiratory: Positive for cough. Negative for chest tightness, shortness of breath, wheezing and stridor.    Cardiovascular: Negative for chest pain, palpitations and leg swelling.   Gastrointestinal: Positive for diarrhea, nausea and vomiting. Negative for abdominal pain and constipation.   Genitourinary: Negative for difficulty urinating, dysuria, flank pain, frequency, hematuria and urgency.   Musculoskeletal: Positive for myalgias. Negative for arthralgias and back pain.   Skin: Negative for color change, pallor and rash.   Neurological: Positive for headaches. Negative for seizures, weakness, paresthesias and loss of balance.   Psychiatric/Behavioral: Negative.        Physical Exam     Initial Vitals [01/30/18 1208]   BP Pulse Resp Temp SpO2   125/87 110 20 98.9 °F (37.2 °C) 96 %      MAP       99.67         Physical Exam    Nursing note and vitals reviewed.  Constitutional: He appears well-developed and well-nourished.   HENT:   Head: Normocephalic and atraumatic.   Right Ear: Tympanic membrane and ear canal normal.   Left Ear: Tympanic membrane and ear canal normal.   Nose: Rhinorrhea present. Right sinus exhibits no maxillary sinus tenderness and no frontal sinus tenderness. Left sinus exhibits no maxillary sinus tenderness and no frontal sinus tenderness.   Mouth/Throat: No oropharyngeal  exudate, posterior oropharyngeal edema or posterior oropharyngeal erythema.   Clear post nasal drip noted. Erythema bilateral nasal mucosa with clear nasal discharge noted.   Eyes: Conjunctivae, EOM and lids are normal. Pupils are equal, round, and reactive to light.   Neck: Normal range of motion. Neck supple.   Cardiovascular: Regular rhythm, normal heart sounds and intact distal pulses. Tachycardia present.    Pulmonary/Chest: Breath sounds normal. No respiratory distress. He has no wheezes. He has no rhonchi. He has no rales.   Abdominal: Soft. Bowel sounds are normal. He exhibits no distension. There is no tenderness.   Musculoskeletal: Normal range of motion.   Lymphadenopathy:     He has no cervical adenopathy.   Neurological: He is alert and oriented to person, place, and time. He has normal strength.   Skin: Skin is warm and dry. Capillary refill takes less than 2 seconds.   Psychiatric: He has a normal mood and affect. His behavior is normal.         ED Course   Procedures  Labs Reviewed   INFLUENZA A AND B ANTIGEN - Abnormal; Notable for the following:        Result Value    Influenza B Ag, EIA Positive (*)     All other components within normal limits   COMPREHENSIVE METABOLIC PANEL - Abnormal; Notable for the following:     Sodium 135 (*)     All other components within normal limits   CBC W/ AUTO DIFFERENTIAL - Abnormal; Notable for the following:     RBC 4.53 (*)     MCH 31.1 (*)     Lymph # 0.8 (*)     Lymph% 13.7 (*)     All other components within normal limits   URINALYSIS        Medications   0.9%  NaCl infusion (1,000 mLs Intravenous New Bag 1/30/18 1253)   ketorolac injection 30 mg (30 mg Intravenous Given 1/30/18 1244)   ondansetron injection 4 mg (4 mg Intravenous Given 1/30/18 1244)     Patient reports improvement in symptoms since administration of Toradol, Zofran and iv fluids.      X-Rays:   Independently Interpreted Readings:   Other Readings:  X-ray reviewed with MD. X-ray without  concerning findings.                       ED Course      Clinical Impression:   The primary encounter diagnosis was Influenza. Diagnoses of Cough and Non-intractable vomiting with nausea, unspecified vomiting type were also pertinent to this visit.    Disposition:   Disposition: Discharged  Condition: Stable     The patient acknowledges that close follow up with medical provider is required. Instructed to follow up with PCP within 2 days. The patient agrees to comply with all instruction and directions given in the ER.      Educated to increase fluid intake and get plenty rest. OTC tylenol or motrin as needed for pain and/or fever. Encouraged frequent hand washing.     New Prescriptions    ONDANSETRON (ZOFRAN) 4 MG TABLET    Take 1 tablet (4 mg total) by mouth every 8 (eight) hours as needed for Nausea.    OSELTAMIVIR (TAMIFLU) 75 MG CAPSULE    Take 1 capsule (75 mg total) by mouth 2 (two) times daily.                         Seema Oakes NP  01/30/18 6101       Seema Oakes NP  01/30/18 0305

## 2018-01-30 NOTE — ED TRIAGE NOTES
47 y.o. male presents to ER   Chief Complaint   Patient presents with    URI   Headache, congestion, sore throat and body aches for 4 days. No acute distress noted.

## 2018-07-16 ENCOUNTER — OFFICE VISIT (OUTPATIENT)
Dept: FAMILY MEDICINE | Facility: CLINIC | Age: 48
End: 2018-07-16
Payer: COMMERCIAL

## 2018-07-16 VITALS
SYSTOLIC BLOOD PRESSURE: 128 MMHG | WEIGHT: 218.63 LBS | DIASTOLIC BLOOD PRESSURE: 64 MMHG | HEIGHT: 73 IN | HEART RATE: 88 BPM | BODY MASS INDEX: 28.98 KG/M2

## 2018-07-16 DIAGNOSIS — G47.00 INSOMNIA, UNSPECIFIED TYPE: ICD-10-CM

## 2018-07-16 DIAGNOSIS — F41.9 ANXIETY: ICD-10-CM

## 2018-07-16 DIAGNOSIS — H83.3X2 NOISE-INDUCED HEARING LOSS OF LEFT EAR, UNSPECIFIED HEARING STATUS ON CONTRALATERAL SIDE: Primary | ICD-10-CM

## 2018-07-16 DIAGNOSIS — F33.1 MODERATE EPISODE OF RECURRENT MAJOR DEPRESSIVE DISORDER: ICD-10-CM

## 2018-07-16 PROCEDURE — 99204 OFFICE O/P NEW MOD 45 MIN: CPT | Mod: S$GLB,,, | Performed by: NURSE PRACTITIONER

## 2018-07-16 PROCEDURE — 3008F BODY MASS INDEX DOCD: CPT | Mod: CPTII,S$GLB,, | Performed by: NURSE PRACTITIONER

## 2018-07-16 PROCEDURE — 99999 PR PBB SHADOW E&M-EST. PATIENT-LVL III: CPT | Mod: PBBFAC,,, | Performed by: NURSE PRACTITIONER

## 2018-07-16 RX ORDER — OLANZAPINE 10 MG/1
10 TABLET ORAL NIGHTLY
Qty: 30 TABLET | Refills: 1 | Status: SHIPPED | OUTPATIENT
Start: 2018-07-16 | End: 2019-07-16

## 2018-07-16 NOTE — PROGRESS NOTES
Subjective:       Patient ID: Efren Babin is a 48 y.o. male.    Chief Complaint: No chief complaint on file.    Here to establish care and has hearing problem. Having some anxiety and depression. Has been diagnosed with bi-polar disorder and has PTSD from [employer] service.      Review of Systems   Constitutional: Positive for fatigue. Negative for appetite change and fever.   HENT: Positive for hearing loss (by history). Negative for congestion, ear pain and sore throat.    Eyes: Negative.  Negative for visual disturbance.   Respiratory: Positive for shortness of breath (on exertion). Negative for cough and wheezing.    Cardiovascular: Positive for palpitations. Negative for chest pain.   Gastrointestinal: Positive for nausea (with the anxiety). Negative for abdominal pain, diarrhea and vomiting.        BM's regular   Genitourinary: Negative.  Negative for difficulty urinating.   Musculoskeletal: Negative.    Skin: Negative.  Negative for rash.   Neurological: Negative.  Negative for headaches (occasionally).   Psychiatric/Behavioral: Positive for sleep disturbance (difficulty falling asleep). The patient is nervous/anxious.         Depression and anxiety   All other systems reviewed and are negative.      Objective:      Physical Exam   Constitutional: He is oriented to person, place, and time. He appears well-developed and well-nourished.   HENT:   Head: Normocephalic and atraumatic.   Right Ear: External ear normal.   Left Ear: External ear normal.   Nose: Nose normal.   Mouth/Throat: Oropharynx is clear and moist.   Eyes: Conjunctivae and EOM are normal. Pupils are equal, round, and reactive to light.   Neck: Normal range of motion. Neck supple.   Cardiovascular: Normal rate, regular rhythm and normal heart sounds.    No murmur heard.  Pulmonary/Chest: Effort normal and breath sounds normal. No respiratory distress.   Abdominal: Soft.   Musculoskeletal: Normal range of motion.   Neurological: He is alert and  oriented to person, place, and time.   Skin: Skin is warm and dry.   Psychiatric: He has a normal mood and affect.   Nursing note and vitals reviewed.      Assessment:       1. Noise-induced hearing loss of left ear, unspecified hearing status on contralateral side    2. Moderate episode of recurrent major depressive disorder    3. Insomnia, unspecified type    4. Anxiety        Plan:   Diagnoses and all orders for this visit:    Noise-induced hearing loss of left ear, unspecified hearing status on contralateral side  -     COMPREHENSIVE AUDIOGRAM; Future    Moderate episode of recurrent major depressive disorder  -     OLANZapine (ZYPREXA) 10 MG tablet; Take 1 tablet (10 mg total) by mouth every evening.  -     Ambulatory referral to Psychiatry    Insomnia, unspecified type  -     OLANZapine (ZYPREXA) 10 MG tablet; Take 1 tablet (10 mg total) by mouth every evening.  -     Ambulatory referral to Psychiatry    Anxiety  -     OLANZapine (ZYPREXA) 10 MG tablet; Take 1 tablet (10 mg total) by mouth every evening.  -     Ambulatory referral to Psychiatry    RTC PRN - 1 week if unable to get in with psych before then

## 2020-03-23 DIAGNOSIS — Z12.11 COLON CANCER SCREENING: ICD-10-CM

## 2021-04-05 NOTE — DISCHARGE INSTRUCTIONS
Patient educated on medication changes and discharge plan with patient verbalized understanding. Patient agrees to comply to discharge plan. Patient encouraged to follow up with primary care provider. No signs of distress or complaints. Consent obtain to fax information for follow-up visit.   Telephone call to pt. and given Dr. Miguel's message. Pt. verbalized understanding. States he will repeat lab later this week.